# Patient Record
Sex: MALE | Race: WHITE | NOT HISPANIC OR LATINO | Employment: OTHER | URBAN - METROPOLITAN AREA
[De-identification: names, ages, dates, MRNs, and addresses within clinical notes are randomized per-mention and may not be internally consistent; named-entity substitution may affect disease eponyms.]

---

## 2018-08-23 ENCOUNTER — HOSPITAL ENCOUNTER (OUTPATIENT)
Facility: HOSPITAL | Age: 53
Discharge: HOME | End: 2018-08-26
Attending: EMERGENCY MEDICINE | Admitting: INTERNAL MEDICINE
Payer: COMMERCIAL

## 2018-08-23 DIAGNOSIS — R31.0 GROSS HEMATURIA: Primary | ICD-10-CM

## 2018-08-23 DIAGNOSIS — R55 SYNCOPE AND COLLAPSE: ICD-10-CM

## 2018-08-23 DIAGNOSIS — R41.89 UNRESPONSIVENESS: ICD-10-CM

## 2018-08-23 DIAGNOSIS — R55 VASOVAGAL SYNCOPE: ICD-10-CM

## 2018-08-23 DIAGNOSIS — R31.9 HEMATURIA, UNSPECIFIED TYPE: ICD-10-CM

## 2018-08-23 DIAGNOSIS — R03.0 ELEVATED BLOOD-PRESSURE READING, WITHOUT DIAGNOSIS OF HYPERTENSION: ICD-10-CM

## 2018-08-23 DIAGNOSIS — D72.829 LEUKOCYTOSIS, UNSPECIFIED TYPE: ICD-10-CM

## 2018-08-23 DIAGNOSIS — R11.0 NAUSEA: ICD-10-CM

## 2018-08-23 DIAGNOSIS — N30.01 ACUTE CYSTITIS WITH HEMATURIA: ICD-10-CM

## 2018-08-23 PROCEDURE — G0378 HOSPITAL OBSERVATION PER HR: HCPCS

## 2018-08-23 PROCEDURE — 51703 INSERT BLADDER CATH COMPLEX: CPT

## 2018-08-23 PROCEDURE — 25000003 PHARM REV CODE 250: Performed by: EMERGENCY MEDICINE

## 2018-08-23 PROCEDURE — 99284 EMERGENCY DEPT VISIT MOD MDM: CPT

## 2018-08-23 RX ORDER — NITROFURANTOIN 25; 75 MG/1; MG/1
100 CAPSULE ORAL
Status: COMPLETED | OUTPATIENT
Start: 2018-08-23 | End: 2018-08-23

## 2018-08-23 RX ADMIN — NITROFURANTOIN (MONOHYDRATE/MACROCRYSTALS) 100 MG: 75; 25 CAPSULE ORAL at 10:08

## 2018-08-24 DIAGNOSIS — R31.0 GROSS HEMATURIA: Primary | ICD-10-CM

## 2018-08-24 PROBLEM — R31.9 URINARY TRACT INFECTION WITH HEMATURIA: Status: ACTIVE | Noted: 2018-08-24

## 2018-08-24 PROBLEM — N39.0 URINARY TRACT INFECTION WITH HEMATURIA: Status: ACTIVE | Noted: 2018-08-24

## 2018-08-24 PROBLEM — D72.829 LEUKOCYTOSIS: Status: RESOLVED | Noted: 2018-08-23 | Resolved: 2018-08-24

## 2018-08-24 PROBLEM — R03.0 ELEVATED BLOOD-PRESSURE READING, WITHOUT DIAGNOSIS OF HYPERTENSION: Status: ACTIVE | Noted: 2018-08-24

## 2018-08-24 PROBLEM — R41.89 UNRESPONSIVE: Status: ACTIVE | Noted: 2018-08-24

## 2018-08-24 LAB
ANION GAP SERPL CALC-SCNC: 10 MMOL/L
BACTERIA #/AREA URNS HPF: ABNORMAL /HPF
BASOPHILS # BLD AUTO: 0 K/UL
BASOPHILS NFR BLD: 0.2 %
BILIRUB UR QL STRIP: NEGATIVE
BUN SERPL-MCNC: 9 MG/DL
CALCIUM SERPL-MCNC: 8.8 MG/DL
CHLORIDE SERPL-SCNC: 101 MMOL/L
CK MB SERPL-MCNC: 1.1 NG/ML
CK MB SERPL-RTO: 1.4 %
CK SERPL-CCNC: 81 U/L
CK SERPL-CCNC: 81 U/L
CLARITY UR: ABNORMAL
CO2 SERPL-SCNC: 25 MMOL/L
COLOR UR: YELLOW
CREAT SERPL-MCNC: 0.8 MG/DL
DIFFERENTIAL METHOD: ABNORMAL
EOSINOPHIL # BLD AUTO: 0 K/UL
EOSINOPHIL NFR BLD: 0.3 %
ERYTHROCYTE [DISTWIDTH] IN BLOOD BY AUTOMATED COUNT: 13.7 %
EST. GFR  (AFRICAN AMERICAN): >60 ML/MIN/1.73 M^2
EST. GFR  (NON AFRICAN AMERICAN): >60 ML/MIN/1.73 M^2
GLUCOSE SERPL-MCNC: 134 MG/DL
GLUCOSE UR QL STRIP: NEGATIVE
HCT VFR BLD AUTO: 39 %
HGB BLD-MCNC: 12.9 G/DL
HGB UR QL STRIP: ABNORMAL
HYALINE CASTS #/AREA URNS LPF: 0 /LPF
KETONES UR QL STRIP: NEGATIVE
LACTATE SERPL-SCNC: 1.1 MMOL/L
LDH SERPL L TO P-CCNC: 181 U/L
LEUKOCYTE ESTERASE UR QL STRIP: ABNORMAL
LYMPHOCYTES # BLD AUTO: 1.6 K/UL
LYMPHOCYTES NFR BLD: 12.8 %
MCH RBC QN AUTO: 28.7 PG
MCHC RBC AUTO-ENTMCNC: 33.2 G/DL
MCV RBC AUTO: 87 FL
MICROSCOPIC COMMENT: ABNORMAL
MONOCYTES # BLD AUTO: 0.8 K/UL
MONOCYTES NFR BLD: 6.4 %
NEUTROPHILS # BLD AUTO: 10.3 K/UL
NEUTROPHILS NFR BLD: 80.3 %
NITRITE UR QL STRIP: NEGATIVE
PH UR STRIP: 8 [PH] (ref 5–8)
PLATELET # BLD AUTO: 265 K/UL
PMV BLD AUTO: 7.7 FL
POCT GLUCOSE: 143 MG/DL (ref 70–110)
POTASSIUM SERPL-SCNC: 3.5 MMOL/L
PROT UR QL STRIP: ABNORMAL
RBC # BLD AUTO: 4.51 M/UL
RBC #/AREA URNS HPF: >100 /HPF (ref 0–4)
SODIUM SERPL-SCNC: 136 MMOL/L
SP GR UR STRIP: 1.02 (ref 1–1.03)
TROPONIN I SERPL DL<=0.01 NG/ML-MCNC: <0.006 NG/ML
TROPONIN I SERPL DL<=0.01 NG/ML-MCNC: <0.006 NG/ML
URN SPEC COLLECT METH UR: ABNORMAL
UROBILINOGEN UR STRIP-ACNC: NEGATIVE EU/DL
WBC # BLD AUTO: 12.8 K/UL
WBC #/AREA URNS HPF: 10 /HPF (ref 0–5)

## 2018-08-24 PROCEDURE — 85025 COMPLETE CBC W/AUTO DIFF WBC: CPT

## 2018-08-24 PROCEDURE — G0378 HOSPITAL OBSERVATION PER HR: HCPCS

## 2018-08-24 PROCEDURE — 25000003 PHARM REV CODE 250: Performed by: UROLOGY

## 2018-08-24 PROCEDURE — 63600175 PHARM REV CODE 636 W HCPCS: Performed by: UROLOGY

## 2018-08-24 PROCEDURE — 82553 CREATINE MB FRACTION: CPT

## 2018-08-24 PROCEDURE — 81000 URINALYSIS NONAUTO W/SCOPE: CPT

## 2018-08-24 PROCEDURE — 82550 ASSAY OF CK (CPK): CPT

## 2018-08-24 PROCEDURE — 87086 URINE CULTURE/COLONY COUNT: CPT

## 2018-08-24 PROCEDURE — 99245 OFF/OP CONSLTJ NEW/EST HI 55: CPT | Mod: 25,,, | Performed by: UROLOGY

## 2018-08-24 PROCEDURE — 93005 ELECTROCARDIOGRAM TRACING: CPT

## 2018-08-24 PROCEDURE — 25000003 PHARM REV CODE 250: Performed by: FAMILY MEDICINE

## 2018-08-24 PROCEDURE — 84484 ASSAY OF TROPONIN QUANT: CPT | Mod: 91

## 2018-08-24 PROCEDURE — 99358 PROLONG SERVICE W/O CONTACT: CPT | Mod: ,,, | Performed by: UROLOGY

## 2018-08-24 PROCEDURE — 25000003 PHARM REV CODE 250: Performed by: EMERGENCY MEDICINE

## 2018-08-24 PROCEDURE — 36415 COLL VENOUS BLD VENIPUNCTURE: CPT

## 2018-08-24 PROCEDURE — 99354 PR PROLONGED SVC, OUPT, 1ST HR: CPT | Mod: ,,, | Performed by: UROLOGY

## 2018-08-24 PROCEDURE — 83615 LACTATE (LD) (LDH) ENZYME: CPT

## 2018-08-24 PROCEDURE — 80048 BASIC METABOLIC PNL TOTAL CA: CPT

## 2018-08-24 PROCEDURE — 25500020 PHARM REV CODE 255

## 2018-08-24 PROCEDURE — 93010 ELECTROCARDIOGRAM REPORT: CPT | Mod: ,,, | Performed by: INTERNAL MEDICINE

## 2018-08-24 PROCEDURE — 84484 ASSAY OF TROPONIN QUANT: CPT

## 2018-08-24 PROCEDURE — 83605 ASSAY OF LACTIC ACID: CPT

## 2018-08-24 PROCEDURE — 63600175 PHARM REV CODE 636 W HCPCS: Performed by: INTERNAL MEDICINE

## 2018-08-24 RX ORDER — SODIUM CHLORIDE 9 MG/ML
INJECTION, SOLUTION INTRAVENOUS CONTINUOUS
Status: DISCONTINUED | OUTPATIENT
Start: 2018-08-24 | End: 2018-08-26 | Stop reason: HOSPADM

## 2018-08-24 RX ORDER — CIPROFLOXACIN 500 MG/1
500 TABLET ORAL 2 TIMES DAILY
Qty: 8 TABLET | Refills: 0 | Status: SHIPPED | OUTPATIENT
Start: 2018-08-25 | End: 2018-08-24 | Stop reason: SDUPTHER

## 2018-08-24 RX ORDER — SODIUM CHLORIDE 9 MG/ML
INJECTION, SOLUTION INTRAVENOUS
Status: DISPENSED
Start: 2018-08-24 | End: 2018-08-24

## 2018-08-24 RX ORDER — GENTAMICIN SULFATE 40 MG/ML
80 INJECTION, SOLUTION INTRAMUSCULAR; INTRAVENOUS ONCE
Status: COMPLETED | OUTPATIENT
Start: 2018-08-24 | End: 2018-08-24

## 2018-08-24 RX ORDER — MORPHINE SULFATE 2 MG/ML
2 INJECTION, SOLUTION INTRAMUSCULAR; INTRAVENOUS EVERY 4 HOURS PRN
Status: DISCONTINUED | OUTPATIENT
Start: 2018-08-24 | End: 2018-08-26 | Stop reason: HOSPADM

## 2018-08-24 RX ORDER — ACETAMINOPHEN 325 MG/1
650 TABLET ORAL EVERY 6 HOURS PRN
Status: DISCONTINUED | OUTPATIENT
Start: 2018-08-24 | End: 2018-08-26 | Stop reason: HOSPADM

## 2018-08-24 RX ORDER — HYDRALAZINE HYDROCHLORIDE 25 MG/1
25 TABLET, FILM COATED ORAL EVERY 8 HOURS
Status: DISCONTINUED | OUTPATIENT
Start: 2018-08-24 | End: 2018-08-26 | Stop reason: HOSPADM

## 2018-08-24 RX ORDER — HYDRALAZINE HYDROCHLORIDE 25 MG/1
25 TABLET, FILM COATED ORAL EVERY 8 HOURS PRN
Qty: 30 TABLET | Refills: 0 | Status: ON HOLD | OUTPATIENT
Start: 2018-08-24 | End: 2018-09-11

## 2018-08-24 RX ORDER — ONDANSETRON 2 MG/ML
4 INJECTION INTRAMUSCULAR; INTRAVENOUS EVERY 8 HOURS PRN
Status: DISCONTINUED | OUTPATIENT
Start: 2018-08-24 | End: 2018-08-26 | Stop reason: HOSPADM

## 2018-08-24 RX ORDER — TAMSULOSIN HYDROCHLORIDE 0.4 MG/1
0.4 CAPSULE ORAL DAILY
Status: DISCONTINUED | OUTPATIENT
Start: 2018-08-24 | End: 2018-08-26 | Stop reason: HOSPADM

## 2018-08-24 RX ORDER — LIDOCAINE HYDROCHLORIDE 20 MG/ML
JELLY TOPICAL ONCE
Status: CANCELLED | OUTPATIENT
Start: 2018-08-24 | End: 2018-08-24

## 2018-08-24 RX ORDER — CIPROFLOXACIN 500 MG/1
500 TABLET ORAL 2 TIMES DAILY
Qty: 12 TABLET | Refills: 0 | OUTPATIENT
Start: 2018-08-25 | End: 2018-08-26 | Stop reason: SDUPTHER

## 2018-08-24 RX ORDER — CEFTRIAXONE 1 G/50ML
1 INJECTION, SOLUTION INTRAVENOUS
Status: DISCONTINUED | OUTPATIENT
Start: 2018-08-24 | End: 2018-08-25

## 2018-08-24 RX ORDER — TAMSULOSIN HYDROCHLORIDE 0.4 MG/1
0.4 CAPSULE ORAL DAILY
Qty: 30 CAPSULE | Refills: 0 | Status: SHIPPED | OUTPATIENT
Start: 2018-08-25 | End: 2018-08-26

## 2018-08-24 RX ADMIN — CEFTRIAXONE 1 G: 1 INJECTION, SOLUTION INTRAVENOUS at 11:08

## 2018-08-24 RX ADMIN — TAMSULOSIN HYDROCHLORIDE 0.4 MG: 0.4 CAPSULE ORAL at 11:08

## 2018-08-24 RX ADMIN — GENTAMICIN SULFATE 80 MG: 40 INJECTION, SOLUTION INTRAMUSCULAR; INTRAVENOUS at 11:08

## 2018-08-24 RX ADMIN — SODIUM CHLORIDE: 0.9 INJECTION, SOLUTION INTRAVENOUS at 12:08

## 2018-08-24 RX ADMIN — MORPHINE SULFATE 2 MG: 2 INJECTION, SOLUTION INTRAMUSCULAR; INTRAVENOUS at 07:08

## 2018-08-24 RX ADMIN — SODIUM CHLORIDE: 0.9 INJECTION, SOLUTION INTRAVENOUS at 09:08

## 2018-08-24 RX ADMIN — MORPHINE SULFATE 2 MG: 2 INJECTION, SOLUTION INTRAMUSCULAR; INTRAVENOUS at 12:08

## 2018-08-24 RX ADMIN — HYDRALAZINE HYDROCHLORIDE 25 MG: 25 TABLET, FILM COATED ORAL at 09:08

## 2018-08-24 RX ADMIN — MORPHINE SULFATE 2 MG: 2 INJECTION, SOLUTION INTRAMUSCULAR; INTRAVENOUS at 11:08

## 2018-08-24 RX ADMIN — IOHEXOL 75 ML: 350 INJECTION, SOLUTION INTRAVENOUS at 11:08

## 2018-08-24 NOTE — ED NOTES
New irrigation bag hung due to the one on board was empty upon arrival to the ER.  Family at bedside.  Urine appears bright red at this time.  No apparent clots noted.

## 2018-08-24 NOTE — HOSPITAL COURSE
Mr. Segovia is a 54 yo male with hx of renal calculi transferred from [facility] for hematuria. He came with 3 way diaz and underwent bladder irrigation for 24 hours. Diaz was removed and he had intermittent blood clot passage with intermittent clear urine with good urine output. At time of discharge on 8/24 he had syncope event while sitting. He was not standing up fast and had not taken blood pressure meds beforehand. He did have difficult to treat HTN during his admission and had lisinopril and hydralazine added to his medications.  Even immediately after syncope episode his blood pressure was elevated. He was given IV abx for UTI and discharged with 5 days of cipro which would complete an 8 day course of antibiotics for UTI.  Urine culture was negative. He had outpatient cystoscopy scheduled with Dr. Lopez. He was monitored on telemetry for 48 hours without any acute events and no evidence of syncope. Echocardiogram showed mild hypertrophy, but normal EF.  Request was placed for cardiology follow up as outpatient. Syncopal episode was thought to be from either cardiogenic cause vs vasovagal.

## 2018-08-24 NOTE — PROGRESS NOTES
[facility] Urology Consult:  Consult from: Dr Cloud, [facility] medicine  Consult for: gross hematuria    Bruce Segovia is a 53 y.o. male who presents for evaluation of gross hematuria.    The patient reports starting to pass some blood in his urine with blood clots approximately 4 days ago, which was progressively worsening to the point where yesterday he was having difficulty passing his urine with some pain associated with urination and presented to [place] [facility].  A non contrasted CT scan was done as he also has a history of kidney stones, and an 18 [place]ag[place] 3 way León catheter was placed and continuous bladder irrigation was initiated.  He was transferred to [facility] for Urology Services and higher level of care.  In the ER he had a white blood cell count of 13.4, lactate of 2.2, hemoglobin and hematocrit of 13.9 and 40.4, a creatinine of 0.66.  CT scan reported 2-3 mm nonobstructing right mid pole calculus and a low attenuating left renal lesion 1.5 cm, potentially a caliceal diverticulum.  The bladder was noted to contain a 7 cm hyper attenuating mass consistent with blood clot, and prostate gland was noted to be enlarged measuring 6 cm transverse.  He was given 1 L of normal saline, and was noted to have greater than 500 cc residual in the bladder, and so the 3 way León catheter, 18 Malagasy, was placed with continuous bladder irrigation and he was transferred after being given a p.o. dose of nitrofurantoin.  Urinalysis had large blood, trace ketones, nitrite positive, leukocyte esterase negative, 0-2 white blood cells, significant red blood cells, moderate bacteria,    On review of medical record, he presented to the emergency department at 4:48 p.m. and after above evaluation, transfer was initiated at 7:19 p.m. on 08/23/2018.  I was initially notified of this patient at 6:30 a.m. on 08/24/2018, and by nursing report he was transferred to the emergency department with the 18 Malag[place] 3 way León  catheter already in place.  No attempts at manual irrigation were done by the ER or by floor nursing overnight and reported that despite CBI urine was still red or punch colored.  Review of ER note notes patient transferred in on CBI with Roberta colored urine admission range with hospitalist.  Again Urology not notified throughout entire transfer or admission process, and consult called in the following morning.    Again, discussion with the patient, he reported approximately 4 days of clot passage in his urine progressively worsening until his presentation yesterday.  He did note for the few months prior to this episode he was having more difficulty passing his urine and a weaker urinary stream.  He has a non/never smoker  He did have gross hematuria and clot passage approximately 4 years ago which he attributed to kidney stone passage at that time.  He has never seen a urologist  No family history of bladder cancer kidney cancer or urothelial carcinoma.  His father had prostate cancer.  He otherwise denies past medical history  He did have mild occasional dysuria, though really only when passing the clots    Upon learning of the patient this morning, I did advise the nurse to pause the CBI and manually irrigate, it was reported to me that there were no clots but the urine remained red.    Past Medical History:   Diagnosis Date    Kidney stone     approx 3-4 yrs ago       Past Surgical History:   Procedure Laterality Date    APPENDECTOMY      approx. 20 yrs ago       No family history on file.    Social History     Socioeconomic History    Marital status:      Spouse name: Not on file    Number of children: Not on file    Years of education: Not on file    Highest education level: Not on file   Social Needs    Financial resource strain: Not on file    Food insecurity - worry: Not on file    Food insecurity - inability: Not on file    Transportation needs - medical: Not on file    Transportation  needs - non-medical: Not on file   Occupational History    Not on file   Tobacco Use    Smoking status: Not on file   Substance and Sexual Activity    Alcohol use: Not on file    Drug use: Not on file    Sexual activity: Not on file   Other Topics Concern    Not on file   Social History Narrative    Not on file       Review of patient's allergies indicates:  No Known Allergies    Medications Reviewed: see MAR    ROS:    Constitutional: denies fevers, chills, night sweats, fatigue, malaise  Respiratory: negative for cough, shortness of breath, wheezing, dyspnea.  Cardiovascular:  negative for chest pain, varicose veins, ankle swelling, palpitations, syncope.  GI: negative for abdominal pain, heartburn, indigestion, nausea, vomiting, constipation, diarrhea, blood in stool.   Urology: as noted above in HPI  Endocrinology: negative for cold intolerance, excessive thirst, not feeling tired/sluggish, no heat intolerance.   Hematology/Lymph: negative for easy bleeding, easy bruising, swollen glands.  Musculoskeletal: negative for back pain, joint pain, joint swelling, neck pain.  Allergy-Immunology: negative for seasonal allergies, negative for unusual infections.   Skin: negative for boils, breast lumps, hives, itching, rash.   Neurology: negative for, dizziness, headache, tingling/numbness, tremors.   Psych: satisfied with life; negative for, anxiety, depression, suicidal thoughts.     PHYSICAL EXAM:    Vitals:    08/24/18 0330   BP: (!) 180/98   Pulse: 75   Resp: 18   Temp: 98.7 °F (37.1 °C)     Body mass index is 29.6 kg/m².       General: Alert, cooperative, no distress, appears stated age  Head: Normocephalic, without obvious abnormality, atraumatic  Neck: no masses, no thyromegaly, no lymphadenopathy  Eyes: PERRL, conjunctiva/corneas clear  Lungs: Respirations unlabored, normal effort, no accessory muscle use  CV: Warm and well perfused extremities  Abdomen: distended, bladder palpable, no CVA tenderness, no  hepatosplenomegaly, no hernia  Penis: phallus normal, circumcised, well cared for, no plaques or lesions.   Scrotum: no cysts, no lesions, no rash, no hydrocele.   Epididymes: normal, nontender, symmetrical, no masses or cysts.   Testes: normal, both descended, no masses.   Urethra: 18 fr diaz in place draining opaque red urine, and blood clots at meatus and in towel under patients diaz  DANIEL: normal sphincter tone, no masses, no hemmorrhoids   PROSTATE: 30g, no nodules, non-tender, symmetrical.   Extremities: Extremities normal, atraumatic, no cyanosis or edema  Skin: Normal color, texture, and turgor, no rashes or lesions  Psych: Appropriate, well oriented, normal affect, normal mood  Neuro: Non-focal    Diaz Change/Manual Irrigation:  Using the patient's current indwelling 18 Citizen of [place] 3 way Diaz catheter, I used a 60 cc catheter tip syringe with sterile water to 1st instill 60 cc into the bladder, and with subsequent syringe full flush and irrigate.  Resistance was met almost immediately.  At this time, given his presentation and concern for clot retention despite Diaz catheter, I did remove his indwelling Diaz catheter and found to be completely clotted off.  Using aseptic technique and sterilely prepping meatus with Betadine, I did then passed a 22 Citizen of [place] 3 way Diaz catheter into the bladder with immediate return of bloody urine, inflated the balloon with 10 cc of sterile water.  With the new catheter I was able to then manually irrigate as above with 60 cc catheter tip syringe and sterile water and medially aspirated syringe fulls of pure blood clots.  He did have a very significant clot burden and did take 2 L of sterile water manual irrigation, the majority of which returned pure clot to clear his bladder.  After 2 L of manual irrigation instill aspirating clots with the patient still uncomfortable and distended abdomen, there was difficulty aspirating through the syringe.  I did notify the operating  room to add the patient on for a clot evacuation, and then return to keep working.  His urine did clear, and ultimately with the urine draining clear I did then perform of bedside bladder scan and found his bladder to be empty was 0 cc around the León catheter.  I did cancel that on and left the León catheter draining clear with a plug in the 3rd port.      Assessment/Diagnosis:    Gross hematuria and clot retention    Plans:  After significant efforts at manual irrigation to clear the patient's bladder of his significant blood clot burden, I was able to review the CT scan images from [facility] which were uploaded into our system which did demonstrate significant clot burden filling his entire distended bladder. Corroborated other CT findings such as small nonobstructing stones.  As the stone was non contrasted, I did recommend a CT urogram to complete his gross hematuria workup imaging and clear his upper tracts.  Also noted that this would re-evaluate the bladder as well.    He has no significant risk factors for gross hematuria.  My suspicion at this time as he has a combination of a urinary tract infection and prostatic obstruction with delayed presentation of gross hematuria as a result of the above combination, after 4 days of clot passage.    I did discuss with both the patient and person common his wife by phone, to be observed throughout the morning for any recurrence of his gross hematuria now that his León catheter was draining clear.  If he did have recurrent significant gross hematuria or clot retention, we would then have to proceed urgently to the operating room for clot evacuation and evaluation of his bladder and management of any significant source of bleeding.  His urine remained relatively clear, would err on the side of outpatient workup with flexible cystourethroscopy, especially given concern for active urinary tract infection with nitrite positive urine and leukocytosis.  We did discuss the  importance of clearing infection before lower tract manipulation.    Given his nitrite positive urine with likely infection and significant efforts to irrigate his bladder, he did only receive 1 p.o. dose of nitrofurantoin in the ER and therefore I initiated 1 g of Rocephin IV and 80 mg of gentamicin IM.  As well, given his prostatomegaly and history of weakening urinary stream prior to clot passage, given dose of Flomax, and advise that he continue it daily.    Should his urine remain clear by lunchtime, can resume diet and if clinically stable would advocate for Diaz removal and voiding trial in the inpatient setting.  If he was unable to void he would then be discharged home with a Diaz catheter.  Assuming he will not need emergent surgery at this time after significant bedside intervention above, when stable for discharge, should be given 1 week empiric antibiotics pending results of urine culture (which will be collected prior to abx after has had time to drain urine only in absence of irrigation) as well as daily flomax, and will plan outpatient cystoscopy.    I was able to review his CT urogram noting simple bilateral renal cysts and clearance of clot burden from bladder. His urine remained clear with light pink tinge by noon and therefore his diaz was removed for voiding trial and he did void clear urine with one small residual clot. Felt clinically improved. Discussed with patient wife and Dr Cloud recommendation to proceed with outpatient cystoscopic workup after 1 week antibiotics, and will follow up culture to make sure culture specific treatment. I have scheduled the patient for cystoscopic evaluation at [facility] on 9/4/18 and should continue daily flomax, increase hydration, complete abx. All questions he and his wife had were answered and agreeable to treatment plan. Should he have recurrence of pascual gross hematuria or clot passage he should return in the interim.    More than 2 hours spent in  direct patient encounter, including prolonged efforts at manual bladder irrigation as described above, and extensive counseling regarding workup and plan. As well, over 1 hour spent on unit and outside of face to face encounter reviewing transfer record, getting supplies for diaz change/irrigation above, and other time spent on unit communicating with nursing staff and OR to coordinate care.

## 2018-08-24 NOTE — PROGRESS NOTES
"Dr. Lopez and I at bed side.  Patient pre-medicated with 2mg morphine.  18Fr 3-way diaz removed.  22Fr 3-way diaz placed. Dr. Lopez manually irrigated diaz.  Retrieved significant amounts of clots.  Patient expressed discomfort.  Patient given a "break" from manual irrigation.  Diaz bag attached.  Urine draining at slow rate.  "

## 2018-08-24 NOTE — ASSESSMENT & PLAN NOTE
As seen on bladder irrigation  Unknown etiology, may be related to nephrolithiasis vs malignancy  Will consult urology for AM  Will obtain renal ultrasound for further characterization of renal lesion  UA pending although may not be much help. Considering leukocytosis will start antibiotic empirically

## 2018-08-24 NOTE — PROGRESS NOTES
Over the phone verbal consent received from patient with with 2 nurse witness, for possible procedure today for Clot Evacuation.

## 2018-08-24 NOTE — PLAN OF CARE
Patient AAOx4.  VSS.  Has remained afebrile this shift.  IVF infusing per orders.  León cath intact with CBI at moderate rate, urine pink-tinged and clot free.  Patient rested peacefully majority of night.  POC reviewed with patient.  Open discussion was facilitated.  Patient verbalized understanding.  Bed in lowest position, side rails up x2, call light within reach, and safety measures maintained throughout shift.  Patient denies any needs at this time.  Will continue to monitor.

## 2018-08-24 NOTE — PLAN OF CARE
Cm completed the assessment at pt's bedside.  Pt arrived from [facility].  Pt is independent in care.  PCP is Dr. Mckinnon.  Insurance verified as [employer].  Pt denies diabetes, dialysis and coumadin.  Disposition:  Pt will discharge to home with family.  Pt verbalized no needs at this time.       08/24/18 1150   Discharge Assessment   Assessment Type Discharge Planning Assessment   Confirmed/corrected address and phone number on facesheet? Yes   Assessment information obtained from? Patient   Communicated expected length of stay with patient/caregiver yes   Prior to hospitilization cognitive status: Alert/Oriented   Prior to hospitalization functional status: Independent   Current cognitive status: Alert/Oriented   Current Functional Status: Independent   Facility Arrived From: [facility]   Lives With spouse   Able to Return to Prior Arrangements yes   Is patient able to care for self after discharge? Yes   Who are your caregiver(s) and their phone number(s)? pamela Bullard - [phone]   Patient's perception of discharge disposition home or selfcare   Readmission Within The Last 30 Days no previous admission in last 30 days   Patient currently being followed by outpatient case management? No   Patient currently receives any other outside agency services? No   Equipment Currently Used at Home none   Do you have any problems affording any of your prescribed medications? No   Is the patient taking medications as prescribed? yes  ([facility])   Does the patient have transportation home? Yes   Transportation Available car;family or friend will provide   Dialysis Name and Scheduled days na   Does the patient receive services at the Coumadin Clinic? No   Discharge Plan A Home with family   Patient/Family In Agreement With Plan yes

## 2018-08-24 NOTE — HPI
Mr. Segovia is a 52 yo with hx of Renal calculi 4 years ago who presented to by transfer from [facility] in [place] for hematuria.  He started having hematuria with passage of large blood clots 3-4 days ago.  He had mild associated suprapubic pain, otherwise no wt loss, diarrhea, abdominal pain, nausea, vomiting, fever, chills, melena, hematochezia.  He had similar less severe problem 4 years ago at which time he had stones without intervention.  Father had prostate cancer, otherwise no malignancy hx in family. Denies tob, etoh, or illicit drugs.  At [place] CT scan showed 2-3 mm non-obstructing stone R midpole.  1.5 cm Left renal lesion possible representing calyceal diverticulum with milk of calcium, and large blood clot in bladder. He had 3 way diaz placed and bladder irrigation started, now with lan colored urine. WBC 13.4, HGB 13.9, plt 260. UA pending here at time of interview.

## 2018-08-24 NOTE — PROGRESS NOTES
Patient arrived to unit via stretcher.  Transferred to bed with assistance.  Peripheral IV intact.  3-way diaz intact with CBI flowing to gravity. Urine color resembles fruit punch.  Patient denies any pain at this time.  Wife at bedside.

## 2018-08-24 NOTE — PROGRESS NOTES
Call placed to Dr Cloud- no immediate response; text message sent via safe chat re: RRT episode. Pt's ekg showing Q waves in leads III and aVF- no ST elevation noted, no inversion of Twaves noted. Pt free of any complaints of pain at present. No complaints of nausea offered. Pt stating he is feeling better. Waiting for labs to be resulted for cardiac enzymes, bmp, cbc. Dr Lopez updated by Kary Ritter RN- orders received for lactic acid- no orders received to transfer pt off of [facility] at this time- Dr Lopez states decision to transfer is up to Dr Cloud. Awaiting further orders from Dr Cloud.

## 2018-08-24 NOTE — SUBJECTIVE & OBJECTIVE
No past medical history on file.    No past surgical history on file.    Review of patient's allergies indicates:  No Known Allergies    No current facility-administered medications on file prior to encounter.      No current outpatient medications on file prior to encounter.     Family History     None        Tobacco Use    Smoking status: Not on file   Substance and Sexual Activity    Alcohol use: Not on file    Drug use: Not on file    Sexual activity: Not on file     Review of Systems   Constitutional: Negative for activity change, appetite change, chills, diaphoresis, fatigue, fever and unexpected weight change.   HENT: Negative for congestion, hearing loss, mouth sores, nosebleeds, sinus pressure, sore throat and trouble swallowing.    Eyes: Negative for photophobia, redness and visual disturbance.   Respiratory: Negative for apnea, cough, chest tightness, shortness of breath and wheezing.    Cardiovascular: Negative for chest pain, palpitations and leg swelling.   Gastrointestinal: Negative for abdominal distention, abdominal pain, blood in stool, constipation, diarrhea, nausea and vomiting.   Endocrine: Negative for polydipsia, polyphagia and polyuria.   Genitourinary: Positive for hematuria. Negative for decreased urine volume, difficulty urinating, dysuria, frequency and urgency.   Musculoskeletal: Negative for arthralgias, back pain, gait problem, myalgias and neck stiffness.   Neurological: Negative for dizziness, tremors, seizures, syncope, weakness, numbness and headaches.   Hematological: Negative for adenopathy. Does not bruise/bleed easily.   Psychiatric/Behavioral: Negative for agitation, behavioral problems, confusion and decreased concentration.     Objective:     Vital Signs (Most Recent):  Temp: 98.5 °F (36.9 °C) (08/23/18 2153)  Pulse: 99 (08/23/18 2153)  Resp: 18 (08/23/18 2153)  BP: (!) 184/107 (08/23/18 2153)  SpO2: 97 % (08/23/18 2153) Vital Signs (24h Range):  Temp:  [98.5 °F (36.9  °C)] 98.5 °F (36.9 °C)  Pulse:  [99] 99  Resp:  [18] 18  SpO2:  [97 %] 97 %  BP: (184)/(107) 184/107     Weight: 74.8 kg (165 lb)  Body mass index is 26.63 kg/m².    Physical Exam   Constitutional: He is oriented to person, place, and time. He appears well-developed and well-nourished. No distress.   HENT:   Head: Normocephalic and atraumatic.   Nose: Nose normal.   Mouth/Throat: No oropharyngeal exudate.   Eyes: EOM are normal. Pupils are equal, round, and reactive to light. Right eye exhibits no discharge. Left eye exhibits no discharge. No scleral icterus.   Neck: Normal range of motion. Neck supple. No JVD present. No tracheal deviation present. No thyromegaly present.   Cardiovascular: Normal rate, regular rhythm, normal heart sounds and intact distal pulses. Exam reveals no gallop and no friction rub.   No murmur heard.  Pulmonary/Chest: Effort normal and breath sounds normal. No stridor. No respiratory distress. He has no wheezes. He has no rales.   Abdominal: Soft. Bowel sounds are normal. He exhibits no distension and no mass. There is no tenderness. There is no guarding.   Genitourinary:   Genitourinary Comments: León catheter in place   Musculoskeletal: Normal range of motion. He exhibits no edema, tenderness or deformity.   Neurological: He is alert and oriented to person, place, and time. No cranial nerve deficit. He exhibits normal muscle tone. Coordination normal.   Skin: Skin is warm and dry. Capillary refill takes less than 2 seconds. Rash noted. He is not diaphoretic. No erythema. No pallor.   Psychiatric: He has a normal mood and affect. His behavior is normal. Judgment and thought content normal.        Significant Labs: All pertinent labs within the past 24 hours have been reviewed.    Significant Imaging: I have reviewed and interpreted all pertinent imaging results/findings within the past 24 hours.

## 2018-08-24 NOTE — ED PROVIDER NOTES
Encounter Date: 8/23/2018       History     Chief Complaint   Patient presents with    Hematuria     transfer from [place] for urology      Chief complaint:  Hematuria    HPI:  53-year-old male presents via ambulance in transfer from [facility] with gross hematuria and urinary retention.  He developed urinary frequency and dysuria 2 days ago with gross hematuria today and difficulty urinating.  Three way catheter was placed with irrigation of the bladder with clots removed.  He has going Roberta a urine.  He is not anticoagulated and has no bleeding diathesis.  H&H is 13.9 and 40.  CT of the abdomen and pelvis demonstrates intrarenal nephrolithiasis of the right kidney.  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          Review of patient's allergies indicates:  No Known Allergies  No past medical history on file.  No past surgical history on file.  No family history on file.  Social History     Tobacco Use    Smoking status: Not on file   Substance Use Topics    Alcohol use: Not on file    Drug use: Not on file     Review of Systems   Constitutional: Negative for activity change, appetite change, chills, fatigue and fever.   Eyes: Negative for visual disturbance.   Respiratory: Negative for apnea and shortness of breath.    Cardiovascular: Negative for chest pain and palpitations.   Gastrointestinal: Negative for abdominal distention and abdominal pain.   Genitourinary: Positive for decreased urine volume, frequency, hematuria and urgency. Negative for difficulty urinating.   Musculoskeletal: Negative for neck pain.   Skin: Negative for pallor and rash.   Neurological: Negative for headaches.   Hematological: Does not bruise/bleed easily.   Psychiatric/Behavioral: Negative for agitation.       Physical Exam     Initial Vitals [08/23/18 2153]   BP Pulse Resp Temp SpO2   (!) 184/107 99 18 98.5 °F (36.9 °C) 97 %      MAP       --         Physical Exam    Nursing note and vitals  reviewed.  Constitutional: He appears well-developed.   HENT:   Head: Normocephalic and atraumatic.   Mouth/Throat: Oropharynx is clear and moist.   Eyes: EOM are normal. Pupils are equal, round, and reactive to light.   Neck: Neck supple.   Cardiovascular: Normal rate, regular rhythm, normal heart sounds and intact distal pulses.   Pulmonary/Chest: Breath sounds normal.   Abdominal: Soft. Bowel sounds are normal. There is no tenderness.   Musculoskeletal: Normal range of motion.   Neurological: He is alert and oriented to person, place, and time.   Skin: Skin is warm and dry.   Psychiatric: He has a normal mood and affect.         ED Course   Procedures  Labs Reviewed   CULTURE, URINE   URINALYSIS          Imaging Results    None          Medical Decision Making:   ED Management:  53-year-old nonsmoker presents with a 2 day history of dysuria with gross hematuria and urinary obstruction.  Clots were irrigated patient will be admitted for bladder irrigation and empiric antibiotic treatment with Urology consultation.  Other:   I have discussed this case with another health care provider.       <> Summary of the Discussion: Discussed with Dr. Varma who will admit the patient                      Clinical Impression:   The encounter diagnosis was Hematuria, unspecified type.                             Sky Bower III, MD  08/23/18 1034

## 2018-08-24 NOTE — H&P (VIEW-ONLY)
[facility] Urology Consult:  Consult from: Dr Cloud, hospitals medicine  Consult for: gross hematuria    Bruce Segovia is a 53 y.o. male who presents for evaluation of gross hematuria.    The patient reports starting to pass some blood in his urine with blood clots approximately 4 days ago, which was progressively worsening to the point where yesterday he was having difficulty passing his urine with some pain associated with urination and presented to [place] [facility].  A non contrasted CT scan was done as he also has a history of kidney stones, and an 18 Filipino 3 way León catheter was placed and continuous bladder irrigation was initiated.  He was transferred to [facility] for Urology Services and higher level of care.  In the ER he had a white blood cell count of 13.4, lactate of 2.2, hemoglobin and hematocrit of 13.9 and 40.4, a creatinine of 0.66.  CT scan reported 2-3 mm nonobstructing right mid pole calculus and a low attenuating left renal lesion 1.5 cm, potentially a caliceal diverticulum.  The bladder was noted to contain a 7 cm hyper attenuating mass consistent with blood clot, and prostate gland was noted to be enlarged measuring 6 cm transverse.  He was given 1 L of normal saline, and was noted to have greater than 500 cc residual in the bladder, and so the 3 way León catheter, 18 Filipino, was placed with continuous bladder irrigation and he was transferred after being given a p.o. dose of nitrofurantoin.  Urinalysis had large blood, trace ketones, nitrite positive, leukocyte esterase negative, 0-2 white blood cells, significant red blood cells, moderate bacteria,    On review of medical record, he presented to the emergency department at 4:48 p.m. and after above evaluation, transfer was initiated at 7:19 p.m. on 08/23/2018.  I was initially notified of this patient at 6:30 a.m. on 08/24/2018, and by nursing report he was transferred to the emergency department with the 18 Filipino 3 way León  catheter already in place.  No attempts at manual irrigation were done by the ER or by floor nursing overnight and reported that despite CBI urine was still red or punch colored.  Review of ER note notes patient transferred in on CBI with Roberta colored urine admission range with hospitalist.  Again Urology not notified throughout entire transfer or admission process, and consult called in the following morning.    Again, discussion with the patient, he reported approximately 4 days of clot passage in his urine progressively worsening until his presentation yesterday.  He did note for the few months prior to this episode he was having more difficulty passing his urine and a weaker urinary stream.  He has a non/never smoker  He did have gross hematuria and clot passage approximately 4 years ago which he attributed to kidney stone passage at that time.  He has never seen a urologist  No family history of bladder cancer kidney cancer or urothelial carcinoma.  His father had prostate cancer.  He otherwise denies past medical history  He did have mild occasional dysuria, though really only when passing the clots    Upon learning of the patient this morning, I did advise the nurse to pause the CBI and manually irrigate, it was reported to me that there were no clots but the urine remained red.    Past Medical History:   Diagnosis Date    Kidney stone     approx 3-4 yrs ago       Past Surgical History:   Procedure Laterality Date    APPENDECTOMY      approx. 20 yrs ago       No family history on file.    Social History     Socioeconomic History    Marital status:      Spouse name: Not on file    Number of children: Not on file    Years of education: Not on file    Highest education level: Not on file   Social Needs    Financial resource strain: Not on file    Food insecurity - worry: Not on file    Food insecurity - inability: Not on file    Transportation needs - medical: Not on file    Transportation  needs - non-medical: Not on file   Occupational History    Not on file   Tobacco Use    Smoking status: Not on file   Substance and Sexual Activity    Alcohol use: Not on file    Drug use: Not on file    Sexual activity: Not on file   Other Topics Concern    Not on file   Social History Narrative    Not on file       Review of patient's allergies indicates:  No Known Allergies    Medications Reviewed: see MAR    ROS:    Constitutional: denies fevers, chills, night sweats, fatigue, malaise  Respiratory: negative for cough, shortness of breath, wheezing, dyspnea.  Cardiovascular:  negative for chest pain, varicose veins, ankle swelling, palpitations, syncope.  GI: negative for abdominal pain, heartburn, indigestion, nausea, vomiting, constipation, diarrhea, blood in stool.   Urology: as noted above in HPI  Endocrinology: negative for cold intolerance, excessive thirst, not feeling tired/sluggish, no heat intolerance.   Hematology/Lymph: negative for easy bleeding, easy bruising, swollen glands.  Musculoskeletal: negative for back pain, joint pain, joint swelling, neck pain.  Allergy-Immunology: negative for seasonal allergies, negative for unusual infections.   Skin: negative for boils, breast lumps, hives, itching, rash.   Neurology: negative for, dizziness, headache, tingling/numbness, tremors.   Psych: satisfied with life; negative for, anxiety, depression, suicidal thoughts.     PHYSICAL EXAM:    Vitals:    08/24/18 0330   BP: (!) 180/98   Pulse: 75   Resp: 18   Temp: 98.7 °F (37.1 °C)     Body mass index is 29.6 kg/m².       General: Alert, cooperative, no distress, appears stated age  Head: Normocephalic, without obvious abnormality, atraumatic  Neck: no masses, no thyromegaly, no lymphadenopathy  Eyes: PERRL, conjunctiva/corneas clear  Lungs: Respirations unlabored, normal effort, no accessory muscle use  CV: Warm and well perfused extremities  Abdomen: distended, bladder palpable, no CVA tenderness, no  hepatosplenomegaly, no hernia  Penis: phallus normal, circumcised, well cared for, no plaques or lesions.   Scrotum: no cysts, no lesions, no rash, no hydrocele.   Epididymes: normal, nontender, symmetrical, no masses or cysts.   Testes: normal, both descended, no masses.   Urethra: 18 fr diaz in place draining opaque red urine, and blood clots at meatus and in towel under patients diaz  DANIEL: normal sphincter tone, no masses, no hemmorrhoids   PROSTATE: 30g, no nodules, non-tender, symmetrical.   Extremities: Extremities normal, atraumatic, no cyanosis or edema  Skin: Normal color, texture, and turgor, no rashes or lesions  Psych: Appropriate, well oriented, normal affect, normal mood  Neuro: Non-focal    Diaz Change/Manual Irrigation:  Using the patient's current indwelling 18 Algerian 3 way Diaz catheter, I used a 60 cc catheter tip syringe with sterile water to 1st instill 60 cc into the bladder, and with subsequent syringe full flush and irrigate.  Resistance was met almost immediately.  At this time, given his presentation and concern for clot retention despite Diaz catheter, I did remove his indwelling Diaz catheter and found to be completely clotted off.  Using aseptic technique and sterilely prepping meatus with Betadine, I did then passed a 22 Algerian 3 way Diaz catheter into the bladder with immediate return of bloody urine, inflated the balloon with 10 cc of sterile water.  With the new catheter I was able to then manually irrigate as above with 60 cc catheter tip syringe and sterile water and medially aspirated syringe fulls of pure blood clots.  He did have a very significant clot burden and did take 2 L of sterile water manual irrigation, the majority of which returned pure clot to clear his bladder.  After 2 L of manual irrigation instill aspirating clots with the patient still uncomfortable and distended abdomen, there was difficulty aspirating through the syringe.  I did notify the operating  room to add the patient on for a clot evacuation, and then return to keep working.  His urine did clear, and ultimately with the urine draining clear I did then perform of bedside bladder scan and found his bladder to be empty was 0 cc around the León catheter.  I did cancel that on and left the León catheter draining clear with a plug in the 3rd port.      Assessment/Diagnosis:    Gross hematuria and clot retention    Plans:  After significant efforts at manual irrigation to clear the patient's bladder of his significant blood clot burden, I was able to review the CT scan images from [place] which were uploaded into our system which did demonstrate significant clot burden filling his entire distended bladder. Corroborated other CT findings such as small nonobstructing stones.  As the stone was non contrasted, I did recommend a CT urogram to complete his gross hematuria workup imaging and clear his upper tracts.  Also noted that this would re-evaluate the bladder as well.    He has no significant risk factors for gross hematuria.  My suspicion at this time as he has a combination of a urinary tract infection and prostatic obstruction with delayed presentation of gross hematuria as a result of the above combination, after 4 days of clot passage.    I did discuss with both the patient and person common his wife by phone, to be observed throughout the morning for any recurrence of his gross hematuria now that his León catheter was draining clear.  If he did have recurrent significant gross hematuria or clot retention, we would then have to proceed urgently to the operating room for clot evacuation and evaluation of his bladder and management of any significant source of bleeding.  His urine remained relatively clear, would err on the side of outpatient workup with flexible cystourethroscopy, especially given concern for active urinary tract infection with nitrite positive urine and leukocytosis.  We did discuss the  importance of clearing infection before lower tract manipulation.    Given his nitrite positive urine with likely infection and significant efforts to irrigate his bladder, he did only receive 1 p.o. dose of nitrofurantoin in the ER and therefore I initiated 1 g of Rocephin IV and 80 mg of gentamicin IM.  As well, given his prostatomegaly and history of weakening urinary stream prior to clot passage, given dose of Flomax, and advise that he continue it daily.    Should his urine remain clear by lunchtime, can resume diet and if clinically stable would advocate for Diaz removal and voiding trial in the inpatient setting.  If he was unable to void he would then be discharged home with a Diaz catheter.  Assuming he will not need emergent surgery at this time after significant bedside intervention above, when stable for discharge, should be given 1 week empiric antibiotics pending results of urine culture (which will be collected prior to abx after has had time to drain urine only in absence of irrigation) as well as daily flomax, and will plan outpatient cystoscopy.    I was able to review his CT urogram noting simple bilateral renal cysts and clearance of clot burden from bladder. His urine remained clear with light pink tinge by noon and therefore his diaz was removed for voiding trial and he did void clear urine with one small residual clot. Felt clinically improved. Discussed with patient wife and Dr Cloud recommendation to proceed with outpatient cystoscopic workup after 1 week antibiotics, and will follow up culture to make sure culture specific treatment. I have scheduled the patient for cystoscopic evaluation at [facility] on 9/4/18 and should continue daily flomax, increase hydration, complete abx. All questions he and his wife had were answered and agreeable to treatment plan. Should he have recurrence of pascual gross hematuria or clot passage he should return in the interim.    More than 2 hours spent in  direct patient encounter, including prolonged efforts at manual bladder irrigation as described above, and extensive counseling regarding workup and plan. As well, over 1 hour spent on unit and outside of face to face encounter reviewing transfer record, getting supplies for diaz change/irrigation above, and other time spent on unit communicating with nursing staff and OR to coordinate care.

## 2018-08-24 NOTE — PROGRESS NOTES
Received call from Kary patients nurse that pt had RRT event. EKG changes. Concern for orhtostasis.  Reported to have soft abdomen, have been voiding well and still clear/pink. Bladder scan with only 14cc.  Repeat wbc reported to be 12.1. No lactate was repeated as was borderline elevated at OSH.  Concern for urosepsis 2/2 nitritie positive urine with night of CBI with poor drainage 2/2 clotted diaz, and need for significant urgent manual irrigation efforts this morning. After doing so and learning he only had a po dose of nitrofurantoin in ER, gave 1g rocephin and 80 gent. Advise patient sounds like he is possibly getting septic. Asked what my course of action would be and if he should be in ICU and I advised that at this time he is emptying his bladder with resolution of his gross hematuria/clot retention, and that he should be evaluated by hospitalist to make further treatment plan. May need to broaden abx, may need icu.

## 2018-08-24 NOTE — PROGRESS NOTES
Consult called to Dr. Lopez at 0630.  Instructed me to pause CBI and manually irrigate diaz.  Cranberry colored urine retrieved without clots.  Patient tolerated well.  Will continue to monitor.

## 2018-08-24 NOTE — PROGRESS NOTES
Call placed to Dr Suazo as I haven't heard back from Dr Cloud- updated on RRT and pt's clinical picture. Dr Suazo states he will transfer pt to PCU for closer monitoring. Kelsey on MS3 updated on new orders for transfer.

## 2018-08-25 PROBLEM — R55 VASOVAGAL SYNCOPE: Status: ACTIVE | Noted: 2018-08-25

## 2018-08-25 LAB
ANION GAP SERPL CALC-SCNC: 11 MMOL/L
BACTERIA UR CULT: NO GROWTH
BASOPHILS # BLD AUTO: 0 K/UL
BASOPHILS NFR BLD: 0.4 %
BUN SERPL-MCNC: 10 MG/DL
CALCIUM SERPL-MCNC: 9.2 MG/DL
CHLORIDE SERPL-SCNC: 105 MMOL/L
CO2 SERPL-SCNC: 22 MMOL/L
CREAT SERPL-MCNC: 0.8 MG/DL
DIFFERENTIAL METHOD: ABNORMAL
EOSINOPHIL # BLD AUTO: 0 K/UL
EOSINOPHIL NFR BLD: 0.5 %
ERYTHROCYTE [DISTWIDTH] IN BLOOD BY AUTOMATED COUNT: 14 %
EST. GFR  (AFRICAN AMERICAN): >60 ML/MIN/1.73 M^2
EST. GFR  (NON AFRICAN AMERICAN): >60 ML/MIN/1.73 M^2
GLUCOSE SERPL-MCNC: 102 MG/DL
HCT VFR BLD AUTO: 36.1 %
HGB BLD-MCNC: 12.2 G/DL
LYMPHOCYTES # BLD AUTO: 1.3 K/UL
LYMPHOCYTES NFR BLD: 16.1 %
MCH RBC QN AUTO: 28.9 PG
MCHC RBC AUTO-ENTMCNC: 33.8 G/DL
MCV RBC AUTO: 86 FL
MONOCYTES # BLD AUTO: 0.8 K/UL
MONOCYTES NFR BLD: 9.6 %
NEUTROPHILS # BLD AUTO: 5.8 K/UL
NEUTROPHILS NFR BLD: 73.4 %
PLATELET # BLD AUTO: 263 K/UL
PMV BLD AUTO: 7.8 FL
POTASSIUM SERPL-SCNC: 3.8 MMOL/L
RBC # BLD AUTO: 4.23 M/UL
SODIUM SERPL-SCNC: 138 MMOL/L
TROPONIN I SERPL DL<=0.01 NG/ML-MCNC: <0.006 NG/ML
WBC # BLD AUTO: 7.9 K/UL

## 2018-08-25 PROCEDURE — 25000003 PHARM REV CODE 250: Performed by: UROLOGY

## 2018-08-25 PROCEDURE — 25000003 PHARM REV CODE 250: Performed by: FAMILY MEDICINE

## 2018-08-25 PROCEDURE — 36415 COLL VENOUS BLD VENIPUNCTURE: CPT

## 2018-08-25 PROCEDURE — 85025 COMPLETE CBC W/AUTO DIFF WBC: CPT

## 2018-08-25 PROCEDURE — 25000003 PHARM REV CODE 250: Performed by: INTERNAL MEDICINE

## 2018-08-25 PROCEDURE — G0378 HOSPITAL OBSERVATION PER HR: HCPCS

## 2018-08-25 PROCEDURE — 80048 BASIC METABOLIC PNL TOTAL CA: CPT

## 2018-08-25 PROCEDURE — 84484 ASSAY OF TROPONIN QUANT: CPT

## 2018-08-25 PROCEDURE — 63600175 PHARM REV CODE 636 W HCPCS: Performed by: FAMILY MEDICINE

## 2018-08-25 RX ORDER — LISINOPRIL 10 MG/1
10 TABLET ORAL DAILY
Status: DISCONTINUED | OUTPATIENT
Start: 2018-08-25 | End: 2018-08-25

## 2018-08-25 RX ORDER — LISINOPRIL 10 MG/1
20 TABLET ORAL DAILY
Status: DISCONTINUED | OUTPATIENT
Start: 2018-08-26 | End: 2018-08-26 | Stop reason: HOSPADM

## 2018-08-25 RX ADMIN — HYDRALAZINE HYDROCHLORIDE 25 MG: 25 TABLET, FILM COATED ORAL at 06:08

## 2018-08-25 RX ADMIN — PIPERACILLIN AND TAZOBACTAM 3.38 G: 3; .375 INJECTION, POWDER, LYOPHILIZED, FOR SOLUTION INTRAVENOUS; PARENTERAL at 01:08

## 2018-08-25 RX ADMIN — PIPERACILLIN AND TAZOBACTAM 3.38 G: 3; .375 INJECTION, POWDER, LYOPHILIZED, FOR SOLUTION INTRAVENOUS; PARENTERAL at 03:08

## 2018-08-25 RX ADMIN — PIPERACILLIN AND TAZOBACTAM 3.38 G: 3; .375 INJECTION, POWDER, LYOPHILIZED, FOR SOLUTION INTRAVENOUS; PARENTERAL at 08:08

## 2018-08-25 RX ADMIN — HYDRALAZINE HYDROCHLORIDE 25 MG: 25 TABLET, FILM COATED ORAL at 03:08

## 2018-08-25 RX ADMIN — HYDRALAZINE HYDROCHLORIDE 25 MG: 25 TABLET, FILM COATED ORAL at 09:08

## 2018-08-25 RX ADMIN — SODIUM CHLORIDE: 0.9 INJECTION, SOLUTION INTRAVENOUS at 09:08

## 2018-08-25 RX ADMIN — LISINOPRIL 10 MG: 10 TABLET ORAL at 11:08

## 2018-08-25 RX ADMIN — PIPERACILLIN AND TAZOBACTAM 3.38 G: 3; .375 INJECTION, POWDER, LYOPHILIZED, FOR SOLUTION INTRAVENOUS; PARENTERAL at 09:08

## 2018-08-25 RX ADMIN — TAMSULOSIN HYDROCHLORIDE 0.4 MG: 0.4 CAPSULE ORAL at 08:08

## 2018-08-25 NOTE — SUBJECTIVE & OBJECTIVE
Interval History: Patient had two episodes of small amount of blood in urine overnight that resolved. No blood in urine this morning. He remains hypertensive with BP >180s systolic.  Had syncopal episode with collapse yesterday evening with associated low blood pressure, had not had BP meds yet.     Review of Systems   Constitutional: Negative for activity change, appetite change, chills, diaphoresis, fatigue, fever and unexpected weight change.   HENT: Negative for congestion, hearing loss, mouth sores, nosebleeds, sinus pressure, sore throat and trouble swallowing.    Eyes: Negative for photophobia, redness and visual disturbance.   Respiratory: Negative for apnea, cough, chest tightness, shortness of breath and wheezing.    Cardiovascular: Negative for chest pain, palpitations and leg swelling.   Gastrointestinal: Negative for abdominal distention, abdominal pain, blood in stool, constipation, diarrhea, nausea and vomiting.   Endocrine: Negative for polydipsia, polyphagia and polyuria.   Genitourinary: Positive for hematuria. Negative for decreased urine volume, difficulty urinating, dysuria, frequency and urgency.   Musculoskeletal: Negative for arthralgias, back pain, gait problem, myalgias and neck stiffness.   Neurological: Negative for dizziness, tremors, seizures, syncope, weakness, numbness and headaches.   Hematological: Negative for adenopathy. Does not bruise/bleed easily.   Psychiatric/Behavioral: Negative for agitation, behavioral problems, confusion and decreased concentration.     Objective:     Vital Signs (Most Recent):  Temp: 98.4 °F (36.9 °C) (08/25/18 0842)  Pulse: 98 (08/25/18 0842)  Resp: 18 (08/25/18 0842)  BP: (!) 175/99 (08/25/18 0842)  SpO2: 95 % (08/25/18 0842) Vital Signs (24h Range):  Temp:  [98.3 °F (36.8 °C)-99.3 °F (37.4 °C)] 98.4 °F (36.9 °C)  Pulse:  [] 98  Resp:  [16-22] 18  SpO2:  [95 %-100 %] 95 %  BP: ()/(53-99) 175/99     Weight: 83.2 kg (183 lb 6.4 oz)  Body mass  index is 29.6 kg/m².    Intake/Output Summary (Last 24 hours) at 8/25/2018 1021  Last data filed at 8/25/2018 0645  Gross per 24 hour   Intake 727.5 ml   Output 1050 ml   Net -322.5 ml      Physical Exam   Constitutional: He is oriented to person, place, and time. He appears well-developed and well-nourished. No distress.   HENT:   Head: Normocephalic and atraumatic.   Nose: Nose normal.   Mouth/Throat: No oropharyngeal exudate.   Eyes: EOM are normal. Pupils are equal, round, and reactive to light. Right eye exhibits no discharge. Left eye exhibits no discharge. No scleral icterus.   Neck: Normal range of motion. Neck supple. No JVD present. No tracheal deviation present. No thyromegaly present.   Cardiovascular: Normal rate, regular rhythm, normal heart sounds and intact distal pulses. Exam reveals no gallop and no friction rub.   No murmur heard.  Pulmonary/Chest: Effort normal and breath sounds normal. No stridor. No respiratory distress. He has no wheezes. He has no rales.   Abdominal: Soft. Bowel sounds are normal. He exhibits no distension and no mass. There is no tenderness. There is no guarding.   Musculoskeletal: Normal range of motion. He exhibits no edema, tenderness or deformity.   Neurological: He is alert and oriented to person, place, and time. No cranial nerve deficit. He exhibits normal muscle tone. Coordination normal.   Skin: Skin is warm and dry. Capillary refill takes less than 2 seconds. No rash noted. He is not diaphoretic. No erythema. No pallor.   Psychiatric: He has a normal mood and affect. His behavior is normal. Judgment and thought content normal.       Significant Labs: All pertinent labs within the past 24 hours have been reviewed.    Significant Imaging: I have reviewed and interpreted all pertinent imaging results/findings within the past 24 hours.

## 2018-08-25 NOTE — PROGRESS NOTES
"1530: Wife reported that the patient was feeling nauseated and was really sweaty. Noted patient to be very diaphoretic, states he feels nauseated and very dizzy, said, "I just don't feel right". RRT called due to patient unresponsive for approximately 15-20 seconds.   VS obtained, EKG completed, bladder scan completed, 14 ml urine. Blood sugar 144.   1700: Assisted patient back into bed from chair, patient reports dizziness, but not as bad as before.    1715: Spoke with Dr. Cloud. New orders received.   1830: Patient is resting quietly in bed, reports feeling better. Wife is at bedside.     "

## 2018-08-25 NOTE — PROGRESS NOTES
[facility] Medicine  Progress Note    Patient Name: Bruce Segovia  MRN: [id number]  Patient Class: OP- Observation   Admission Date: 8/23/2018  Length of Stay: 0 days  Attending Physician: Wendy Cloud MD  Primary Care Provider: Sam Smith MD      Subjective:     Principal Problem:Hematuria    HPI:  Mr. Segovia jyotsna 52 yo with hx of Renal calculi 4 years ago who presented to by transfer from [facility] in [place] for hematuria.  He started having hematuria with passage of large blood clots 3-4 days ago.  He had mild associated suprapubic pain, otherwise no wt loss, diarrhea, abdominal pain, nausea, vomiting, fever, chills, melena, hematochezia.  He had similar less severe problem 4 years ago at which time he had stones without intervention.  Father had prostate cancer, otherwise no malignancy hx in family. Denies tob, etoh, or illicit drugs.  At [place] CT scan showed 203 mm non-obstructing stone R midpole.  1.5 cm Left renal lesion possible representing calyceal diverticulum with milk of calcium, and large blood clot in bladder. He had 3 way diaz placed and bladder irrigation started, now with lan colored urine. WBC 13.4, HGB 13.9, plt 260. UA pending here at time of interview.     Hospital Course:  He was treated with continuous bladder irrigation. There was a large blood clot within the bladder. It eventually was able to be broken up and voided as evidenced by repeat CT. He was given IV abx for UTI. He required anti hypertensives. He was able to void after diaz removal.     Interval History:  Patient seen and examined at bedside with urology this afternoon. Diaz had recently been removed and he voided. He felt ready for discharge home. He was started on hydralazine for HTN. All orders were placed.     However, he had a syncopal episode with hypotension after he received his first dose of hydralazine. His WBC is still elevated so it was deemed that he remain  hospitalized for further observation.     Review of Systems   Respiratory: Negative for shortness of breath.    Cardiovascular: Negative for chest pain.   Gastrointestinal: Negative for abdominal pain.     Objective:     Vital Signs (Most Recent):  Temp: 99.3 °F (37.4 °C) (08/24/18 2002)  Pulse: 104 (08/24/18 2002)  Resp: 16 (08/24/18 2002)  BP: (!) 176/90 (08/24/18 2002)  SpO2: 97 % (08/24/18 2002) Vital Signs (24h Range):  Temp:  [98.7 °F (37.1 °C)-99.3 °F (37.4 °C)] 99.3 °F (37.4 °C)  Pulse:  [] 104  Resp:  [16-22] 16  SpO2:  [95 %-100 %] 97 %  BP: ()/(53-98) 176/90     Weight: 83.2 kg (183 lb 6.4 oz)  Body mass index is 29.6 kg/m².    Intake/Output Summary (Last 24 hours) at 8/25/2018 0035  Last data filed at 8/24/2018 0543  Gross per 24 hour   Intake --   Output 650 ml   Net -650 ml      Physical Exam   Constitutional: He appears well-developed and well-nourished. No distress.   HENT:   Head: Normocephalic and atraumatic.   Eyes: Conjunctivae are normal.   Neck: Neck supple. No JVD present.   Cardiovascular: Normal rate, regular rhythm and normal heart sounds.   Pulmonary/Chest: Effort normal and breath sounds normal.   Abdominal: Soft. Bowel sounds are normal. He exhibits no distension. There is no tenderness.   Musculoskeletal: He exhibits no edema.   Neurological: He is alert.   Psychiatric: He has a normal mood and affect. His behavior is normal.   Nursing note and vitals reviewed.       Significant Labs:   BMP:   Recent Labs   Lab  08/24/18   1503   GLU  134*   NA  136   K  3.5   CL  101   CO2  25   BUN  9   CREATININE  0.8   CALCIUM  8.8     CBC:   Recent Labs   Lab  08/24/18   1503   WBC  12.80*   HGB  12.9*   HCT  39.0*   PLT  265       Significant Imaging: I have reviewed all pertinent imaging results/findings within the past 24 hours.    Assessment/Plan:      * Hematuria    Outpt cysto on Tuesday. Urology following.           Vasovagal syncope    Likely secondary to hypotension. Consider  further imaging.           Elevated blood-pressure reading, without diagnosis of hypertension    Hypotension resolved. Still hypertensive. Monitor.           Urinary tract infection with hematuria    Rocephin changed to Zosyn. F/u Ucx.             VTE Risk Mitigation (From admission, onward)        Ordered     IP VTE LOW RISK PATIENT  Once      08/24/18 0000        Discharge held. Reassess tomorrow.   Wendy Cloud MD  Department of Hospital Medicine   [facility]

## 2018-08-25 NOTE — ASSESSMENT & PLAN NOTE
Hypotension resolved. Still hypertensive. Need better control of BP. No events on telemetry overnight. Need echocardiogram to evaluate for Hypertensive hypertrophy before discharge, since he will be sent on treatment for uncontrolled hypertension

## 2018-08-25 NOTE — PROGRESS NOTES
[facility] Medicine  Progress Note    Patient Name: Bruce Segovia  MRN: [id number]  Patient Class: OP- Observation   Admission Date: 8/23/2018  Length of Stay: 0 days  Attending Physician: Hiram Varma MD  Primary Care Provider: Sam Smith MD        Subjective:     Principal Problem:Hematuria    HPI:  Mr. Segovia jyotsna 54 yo with hx of Renal calculi 4 years ago who presented to by transfer from [facility] in [place] for hematuria.  He started having hematuria with passage of large blood clots 3-4 days ago.  He had mild associated suprapubic pain, otherwise no wt loss, diarrhea, abdominal pain, nausea, vomiting, fever, chills, melena, hematochezia.  He had similar less severe problem 4 years ago at which time he had stones without intervention.  Father had prostate cancer, otherwise no malignancy hx in family. Denies tob, etoh, or illicit drugs.  At [place] CT scan showed 203 mm non-obstructing stone R midpole.  1.5 cm Left renal lesion possible representing calyceal diverticulum with milk of calcium, and large blood clot in bladder. He had 3 way diaz placed and bladder irrigation started, now with lan colored urine. WBC 13.4, HGB 13.9, plt 260. UA pending here at time of interview.     Hospital Course:  He was treated with continuous bladder irrigation. There was a large blood clot within the bladder. It eventually was able to be broken up and voided as evidenced by repeat CT. He was given IV abx for UTI. He required anti hypertensives. He was able to void after diaz removal.     Interval History: Patient had two episodes of small amount of blood in urine overnight that resolved. No blood in urine this morning. He remains hypertensive with BP >180s systolic.  Had syncopal episode with collapse yesterday evening with associated low blood pressure, had not had BP meds yet.     Review of Systems   Constitutional: Negative for activity change, appetite change, chills,  diaphoresis, fatigue, fever and unexpected weight change.   HENT: Negative for congestion, hearing loss, mouth sores, nosebleeds, sinus pressure, sore throat and trouble swallowing.    Eyes: Negative for photophobia, redness and visual disturbance.   Respiratory: Negative for apnea, cough, chest tightness, shortness of breath and wheezing.    Cardiovascular: Negative for chest pain, palpitations and leg swelling.   Gastrointestinal: Negative for abdominal distention, abdominal pain, blood in stool, constipation, diarrhea, nausea and vomiting.   Endocrine: Negative for polydipsia, polyphagia and polyuria.   Genitourinary: Positive for hematuria. Negative for decreased urine volume, difficulty urinating, dysuria, frequency and urgency.   Musculoskeletal: Negative for arthralgias, back pain, gait problem, myalgias and neck stiffness.   Neurological: Negative for dizziness, tremors, seizures, syncope, weakness, numbness and headaches.   Hematological: Negative for adenopathy. Does not bruise/bleed easily.   Psychiatric/Behavioral: Negative for agitation, behavioral problems, confusion and decreased concentration.     Objective:     Vital Signs (Most Recent):  Temp: 98.4 °F (36.9 °C) (08/25/18 0842)  Pulse: 98 (08/25/18 0842)  Resp: 18 (08/25/18 0842)  BP: (!) 175/99 (08/25/18 0842)  SpO2: 95 % (08/25/18 0842) Vital Signs (24h Range):  Temp:  [98.3 °F (36.8 °C)-99.3 °F (37.4 °C)] 98.4 °F (36.9 °C)  Pulse:  [] 98  Resp:  [16-22] 18  SpO2:  [95 %-100 %] 95 %  BP: ()/(53-99) 175/99     Weight: 83.2 kg (183 lb 6.4 oz)  Body mass index is 29.6 kg/m².    Intake/Output Summary (Last 24 hours) at 8/25/2018 1021  Last data filed at 8/25/2018 0645  Gross per 24 hour   Intake 727.5 ml   Output 1050 ml   Net -322.5 ml      Physical Exam   Constitutional: He is oriented to person, place, and time. He appears well-developed and well-nourished. No distress.   HENT:   Head: Normocephalic and atraumatic.   Nose: Nose normal.    Mouth/Throat: No oropharyngeal exudate.   Eyes: EOM are normal. Pupils are equal, round, and reactive to light. Right eye exhibits no discharge. Left eye exhibits no discharge. No scleral icterus.   Neck: Normal range of motion. Neck supple. No JVD present. No tracheal deviation present. No thyromegaly present.   Cardiovascular: Normal rate, regular rhythm, normal heart sounds and intact distal pulses. Exam reveals no gallop and no friction rub.   No murmur heard.  Pulmonary/Chest: Effort normal and breath sounds normal. No stridor. No respiratory distress. He has no wheezes. He has no rales.   Abdominal: Soft. Bowel sounds are normal. He exhibits no distension and no mass. There is no tenderness. There is no guarding.   Musculoskeletal: Normal range of motion. He exhibits no edema, tenderness or deformity.   Neurological: He is alert and oriented to person, place, and time. No cranial nerve deficit. He exhibits normal muscle tone. Coordination normal.   Skin: Skin is warm and dry. Capillary refill takes less than 2 seconds. No rash noted. He is not diaphoretic. No erythema. No pallor.   Psychiatric: He has a normal mood and affect. His behavior is normal. Judgment and thought content normal.       Significant Labs: All pertinent labs within the past 24 hours have been reviewed.    Significant Imaging: I have reviewed and interpreted all pertinent imaging results/findings within the past 24 hours.    Assessment/Plan:      * Hematuria    Outpt cysto on Tuesday. Urology following.           Vasovagal syncope    Likely secondary to hypotension, however have not ruled out cardiogenic causes. Will obtain echocardiogram.  Telemetry without events other than slight tachycardia on hydralazine.           Elevated blood-pressure reading, without diagnosis of hypertension    Hypotension resolved. Still hypertensive. Need better control of BP. No events on telemetry overnight. Need echocardiogram to evaluate for Hypertensive  hypertrophy before discharge, since he will be sent on treatment for uncontrolled hypertension          Urinary tract infection with hematuria    Rocephin changed to Zosyn. F/u Ucx.             VTE Risk Mitigation (From admission, onward)        Ordered     IP VTE LOW RISK PATIENT  Once      08/24/18 0000              Hiram Varma MD  Department of Hospital Medicine   [facility]

## 2018-08-25 NOTE — SUBJECTIVE & OBJECTIVE
Interval History:  Patient seen and examined at bedside with urology this afternoon. León had recently been removed and he voided. He felt ready for discharge home. He was started on hydralazine for HTN. All orders were placed.     However, he had a syncopal episode with hypotension after he received his first dose of hydralazine. His WBC is still elevated so it was deemed that he remain hospitalized for further observation.     Review of Systems   Respiratory: Negative for shortness of breath.    Cardiovascular: Negative for chest pain.   Gastrointestinal: Negative for abdominal pain.     Objective:     Vital Signs (Most Recent):  Temp: 99.3 °F (37.4 °C) (08/24/18 2002)  Pulse: 104 (08/24/18 2002)  Resp: 16 (08/24/18 2002)  BP: (!) 176/90 (08/24/18 2002)  SpO2: 97 % (08/24/18 2002) Vital Signs (24h Range):  Temp:  [98.7 °F (37.1 °C)-99.3 °F (37.4 °C)] 99.3 °F (37.4 °C)  Pulse:  [] 104  Resp:  [16-22] 16  SpO2:  [95 %-100 %] 97 %  BP: ()/(53-98) 176/90     Weight: 83.2 kg (183 lb 6.4 oz)  Body mass index is 29.6 kg/m².    Intake/Output Summary (Last 24 hours) at 8/25/2018 0035  Last data filed at 8/24/2018 0543  Gross per 24 hour   Intake --   Output 650 ml   Net -650 ml      Physical Exam   Constitutional: He appears well-developed and well-nourished. No distress.   HENT:   Head: Normocephalic and atraumatic.   Eyes: Conjunctivae are normal.   Neck: Neck supple. No JVD present.   Cardiovascular: Normal rate, regular rhythm and normal heart sounds.   Pulmonary/Chest: Effort normal and breath sounds normal.   Abdominal: Soft. Bowel sounds are normal. He exhibits no distension. There is no tenderness.   Musculoskeletal: He exhibits no edema.   Neurological: He is alert.   Psychiatric: He has a normal mood and affect. His behavior is normal.   Nursing note and vitals reviewed.       Significant Labs:   BMP:   Recent Labs   Lab  08/24/18   1503   GLU  134*   NA  136   K  3.5   CL  101   CO2  25   BUN  9    CREATININE  0.8   CALCIUM  8.8     CBC:   Recent Labs   Lab  08/24/18   1503   WBC  12.80*   HGB  12.9*   HCT  39.0*   PLT  265       Significant Imaging: I have reviewed all pertinent imaging results/findings within the past 24 hours.

## 2018-08-25 NOTE — ASSESSMENT & PLAN NOTE
Likely secondary to hypotension, however have not ruled out cardiogenic causes. Will obtain echocardiogram.  Telemetry without events other than slight tachycardia on hydralazine.

## 2018-08-25 NOTE — PLAN OF CARE
Problem: Patient Care Overview  Goal: Plan of Care Review  Outcome: Ongoing (interventions implemented as appropriate)  Plan of care reviewed with pt at beginning of shift.  Pt/family verbalized understanding. Hourly/ Q2 hourly rounds completed on this pt throughout shift.  Pain monitored, restroom offered, repositions self. safety maintained.  Patient has remained free from fall/injury, no skin breakdown noted.  Side rails up x2, bed in locked and lowest position, call light kept within reach.  Needs attended to, will continue to monitor/ update as indicated

## 2018-08-26 VITALS
HEART RATE: 82 BPM | DIASTOLIC BLOOD PRESSURE: 86 MMHG | BODY MASS INDEX: 29.41 KG/M2 | TEMPERATURE: 97 F | HEIGHT: 66 IN | RESPIRATION RATE: 18 BRPM | OXYGEN SATURATION: 99 % | SYSTOLIC BLOOD PRESSURE: 160 MMHG | WEIGHT: 183 LBS

## 2018-08-26 LAB
AORTIC ROOT ANNULUS: 3.21 CM
AORTIC VALVE CUSP SEPERATION: 2.51 CM
AV MEAN GRADIENT: 3.71 MMHG
AV PEAK GRADIENT: 7.27 MMHG
AV VALVE AREA: 2.85 CM2
BSA FOR ECHO PROCEDURE: 1.97 M2
CV ECHO LV RWT: 0.6 CM
DOP CALC AO PEAK VEL: 1.35 M/S
DOP CALC AO VTI: 23.15 CM
DOP CALC LVOT AREA: 3.59 CM2
DOP CALC LVOT DIAMETER: 2.14 CM
DOP CALC LVOT STROKE VOLUME: 66 CM3
DOP CALCLVOT PEAK VEL VTI: 18.36 CM
E WAVE DECELERATION TIME: 236.13 MSEC
E/A RATIO: 0.8
E/E' RATIO: 8.71
ECHO LV POSTERIOR WALL: 1.22 CM (ref 0.6–1.1)
FRACTIONAL SHORTENING: 28 % (ref 28–44)
INTERVENTRICULAR SEPTUM: 1.24 CM (ref 0.6–1.1)
IVRT: 0.12 MSEC
LEFT ATRIUM SIZE: 3.32 CM
LEFT INTERNAL DIMENSION IN SYSTOLE: 2.95 CM (ref 2.1–4)
LEFT VENTRICLE MASS INDEX: 89.8 G/M2
LEFT VENTRICULAR INTERNAL DIMENSION IN DIASTOLE: 4.08 CM (ref 3.5–6)
LEFT VENTRICULAR MASS: 176.83 G
LV LATERAL E/E' RATIO: 9.25
LV SEPTAL E/E' RATIO: 8.22
MV PEAK A VEL: 0.93 M/S
MV PEAK E VEL: 0.74 M/S
MV STENOSIS PRESSURE HALF TIME: 68.48 MS
MV VALVE AREA P 1/2 METHOD: 3.21 CM2
PISA TR MAX VEL: 2.56 M/S
PULM VEIN A" WAVE DURATION": 88 MSEC
PV PEAK GRADIENT: 3.06 MMHG
PV PEAK VELOCITY: 0.88 CM/S
RA PRESSURE: 3 MMHG
RIGHT VENTRICULAR END-DIASTOLIC DIMENSION: 2.64 CM
TDI LATERAL: 0.08
TDI SEPTAL: 0.09
TDI: 0.09
TR MAX PG: 26.21 MMHG
TRICUSPID ANNULAR PLANE SYSTOLIC EXCURSION: 0.03 CM
TV REST PULMONARY ARTERY PRESSURE: 29.21 MMHG

## 2018-08-26 PROCEDURE — 25000003 PHARM REV CODE 250: Performed by: UROLOGY

## 2018-08-26 PROCEDURE — 63600175 PHARM REV CODE 636 W HCPCS: Performed by: FAMILY MEDICINE

## 2018-08-26 PROCEDURE — 25000003 PHARM REV CODE 250: Performed by: FAMILY MEDICINE

## 2018-08-26 PROCEDURE — 25000003 PHARM REV CODE 250: Performed by: INTERNAL MEDICINE

## 2018-08-26 PROCEDURE — G0378 HOSPITAL OBSERVATION PER HR: HCPCS

## 2018-08-26 RX ORDER — TAMSULOSIN HYDROCHLORIDE 0.4 MG/1
0.4 CAPSULE ORAL DAILY
Qty: 30 CAPSULE | Refills: 0 | Status: SHIPPED | OUTPATIENT
Start: 2018-08-26 | End: 2018-11-19

## 2018-08-26 RX ORDER — HYDRALAZINE HYDROCHLORIDE 25 MG/1
25 TABLET, FILM COATED ORAL EVERY 8 HOURS
Qty: 90 TABLET | Refills: 0 | Status: SHIPPED | OUTPATIENT
Start: 2018-08-26 | End: 2018-11-19

## 2018-08-26 RX ORDER — CIPROFLOXACIN 500 MG/1
500 TABLET ORAL 2 TIMES DAILY
Qty: 10 TABLET | Refills: 0 | Status: SHIPPED | OUTPATIENT
Start: 2018-08-26 | End: 2018-08-31

## 2018-08-26 RX ORDER — LISINOPRIL 20 MG/1
20 TABLET ORAL DAILY
Qty: 90 TABLET | Refills: 0 | Status: SHIPPED | OUTPATIENT
Start: 2018-08-27 | End: 2019-08-27

## 2018-08-26 RX ADMIN — LISINOPRIL 20 MG: 10 TABLET ORAL at 08:08

## 2018-08-26 RX ADMIN — PIPERACILLIN AND TAZOBACTAM 3.38 G: 3; .375 INJECTION, POWDER, LYOPHILIZED, FOR SOLUTION INTRAVENOUS; PARENTERAL at 10:08

## 2018-08-26 RX ADMIN — TAMSULOSIN HYDROCHLORIDE 0.4 MG: 0.4 CAPSULE ORAL at 08:08

## 2018-08-26 RX ADMIN — PIPERACILLIN AND TAZOBACTAM 3.38 G: 3; .375 INJECTION, POWDER, LYOPHILIZED, FOR SOLUTION INTRAVENOUS; PARENTERAL at 04:08

## 2018-08-26 RX ADMIN — HYDRALAZINE HYDROCHLORIDE 25 MG: 25 TABLET, FILM COATED ORAL at 05:08

## 2018-08-26 NOTE — NURSING
Discharge instructions given to patient he verbalized understanding. All questions and concerns answered. Prescriptions given PIV and cardiac monitor discontinued pt left via wheelchair with all of his belongings at 12:00.

## 2018-08-26 NOTE — PLAN OF CARE
08/26/18 1101   Final Note   Assessment Type Final Discharge Note   Discharge Disposition Home

## 2018-08-26 NOTE — PLAN OF CARE
Problem: Patient Care Overview  Goal: Plan of Care Review  Outcome: Ongoing (interventions implemented as appropriate)  Plan of care reviewed with pt at beginning of shift- continue IVF, monitoring of BP and urine output/characteristics.  Pt/family verbalized understanding. Hourly/ Q2 hourly rounds completed on this pt throughout shift.  Pt has denied pain throughout shift, voiding with use of urinal.  One episode of bloody urine with clots present.  Subsequent void with slight pink color, followed by return of urine to clear yellow.  BP slightly improved from day shift.  Pt repositions self, safety maintained.  Patient has remained free from fall/injury, no skin breakdown noted.  Side rails up x2, bed in locked and lowest position, call light kept within reach.  Spouse remains at bedside  Needs attended to, will continue to monitor/ update as indicated

## 2018-08-27 ENCOUNTER — TELEPHONE (OUTPATIENT)
Dept: MEDSURG UNIT | Facility: HOSPITAL | Age: 53
End: 2018-08-27

## 2018-08-27 NOTE — PROGRESS NOTES
Spoke with Brianna at [facility], she stated that she will fax this to the office.

## 2018-08-27 NOTE — DISCHARGE SUMMARY
[facility] Medicine  Discharge Summary      Patient Name: Bruce Segovia  MRN: [id number]  Admission Date: 8/23/2018  Hospital Length of Stay: 0 days  Discharge Date and Time:  08/26/2018 10:01 PM  Attending Physician: No att. providers found   Discharging Provider: Hiram Varma MD  Primary Care Provider: Sam Smith MD      HPI:   Mr. Segovia is a 52 yo with hx of Renal calculi 4 years ago who presented to by transfer from [facility] in [place] for hematuria.  He started having hematuria with passage of large blood clots 3-4 days ago.  He had mild associated suprapubic pain, otherwise no wt loss, diarrhea, abdominal pain, nausea, vomiting, fever, chills, melena, hematochezia.  He had similar less severe problem 4 years ago at which time he had stones without intervention.  Father had prostate cancer, otherwise no malignancy hx in family. Denies tob, etoh, or illicit drugs.  At [place] CT scan showed 2-3 mm non-obstructing stone R midpole.  1.5 cm Left renal lesion possible representing calyceal diverticulum with milk of calcium, and large blood clot in bladder. He had 3 way diaz placed and bladder irrigation started, now with lan colored urine. WBC 13.4, HGB 13.9, plt 260. UA pending here at time of interview.     Procedure(s) (LRB):  CYSTOSCOPY (N/A)      Hospital Course:   Mr. Segovia is a 52 yo male with hx of renal calculi transferred from [facility] for hematuria. He came with 3 way diaz and underwent bladder irrigation for 24 hours. Diaz was removed and he had intermittent blood clot passage with intermittent clear urine with good urine output. At time of discharge on 8/24 he had syncope event while sitting. He was not standing up fast and had not taken blood pressure meds beforehand. He did have difficult to treat HTN during his admission and had lisinopril and hydralazine added to his medications.  Even immediately after syncope episode his blood  pressure was elevated. He was given IV abx for UTI and discharged with 5 days of cipro which would complete an 8 day course of antibiotics for UTI.  Urine culture was negative. He had outpatient cystoscopy scheduled with Dr. Lopez. He was monitored on telemetry for 48 hours without any acute events and no evidence of syncope. Echocardiogram showed mild hypertrophy, but normal EF.  Request was placed for cardiology follow up as outpatient. Syncopal episode was thought to be from either cardiogenic cause vs vasovagal.      Consults:     No new Assessment & Plan notes have been filed under this hospital service since the last note was generated.  Service: Hospital Medicine    Final Active Diagnoses:    Diagnosis Date Noted POA    PRINCIPAL PROBLEM:  Hematuria [R31.9] 08/23/2018 Yes    Vasovagal syncope [R55] 08/25/2018 No    Urinary tract infection with hematuria [N39.0, R31.9] 08/24/2018 Yes    Elevated blood-pressure reading, without diagnosis of hypertension [R03.0] 08/24/2018 Yes      Problems Resolved During this Admission:    Diagnosis Date Noted Date Resolved POA    Leukocytosis [D72.829] 08/23/2018 08/24/2018 Yes       Discharged Condition: good    Disposition: Home or Self Care    Follow Up:  Follow-up Information     Sam Smith MD In 1 week.    Specialty:  Family Medicine  Why:  hypertension  Contact information:  [address] [facility] [place] [address]  [phone]             Rodo Lopez MD On 8/28/2018.    Specialty:  Urology  Contact information:  [address] [place] [address]  [address]  [place] [address] [phone]             aSm Smith MD In 1 week.    Specialty:  Family Medicine  Contact information:  [address] [facility] [address] [facility] [address] [place] [address]  [phone]             Koko Ayers MD In 2 weeks.    Specialty:  Cardiology  Contact information:  [address]  [address]  [place] [address] [phone]                 Patient Instructions:      Diet Cardiac      Activity as tolerated       Significant Diagnostic Studies: Labs: All labs within the past 24 hours have been reviewed  Microbiology: Blood Culture No results found for: LABBLOO  Radiology: X-Ray: CXR: X-Ray Chest 1 View (CXR): No results found for this visit on 08/23/18.  CT scan: CT ABDOMEN PELVIS WITH CONTRAST: No results found for this visit on 08/23/18.    Pending Diagnostic Studies:     Procedure Component Value Units Date/Time    EKG 12-lead [[id number]]     Order Status:  Sent Lab Status:  No result     EKG 12-lead [[id number]]     Order Status:  Sent Lab Status:  No result          Medications:  Reconciled Home Medications:      Medication List      START taking these medications    ciprofloxacin HCl 500 MG tablet  Commonly known as:  CIPRO  Take 1 tablet (500 mg total) by mouth 2 (two) times daily. Start tonight for 5 days     * hydrALAZINE 25 MG tablet  Commonly known as:  APRESOLINE  Take 1 tablet (25 mg total) by mouth every 8 (eight) hours as needed (bp >140/90).     * hydrALAZINE 25 MG tablet  Commonly known as:  APRESOLINE  Take 1 tablet (25 mg total) by mouth every 8 (eight) hours.     lisinopril 20 MG tablet  Commonly known as:  PRINIVIL,ZESTRIL  Take 1 tablet (20 mg total) by mouth once daily.     tamsulosin 0.4 mg Cap  Commonly known as:  FLOMAX  Take 1 capsule (0.4 mg total) by mouth once daily.         * This list has 2 medication(s) that are the same as other medications prescribed for you. Read the directions carefully, and ask your doctor or other care provider to review them with you.                Indwelling Lines/Drains at time of discharge:   Lines/Drains/Airways          None          Time spent on the discharge of patient: 32 minutes  Patient was seen and examined on the date of discharge and determined to be suitable for discharge.         Hiram Varma MD  Department of Hospital Medicine  [facility]

## 2018-08-31 DIAGNOSIS — R03.0 ELEVATED BLOOD PRESSURE READING WITHOUT DIAGNOSIS OF HYPERTENSION: Primary | ICD-10-CM

## 2018-09-05 ENCOUNTER — OFFICE VISIT (OUTPATIENT)
Dept: CARDIOLOGY | Facility: CLINIC | Age: 53
End: 2018-09-05
Payer: COMMERCIAL

## 2018-09-05 VITALS
HEIGHT: 66 IN | DIASTOLIC BLOOD PRESSURE: 85 MMHG | BODY MASS INDEX: 29.87 KG/M2 | SYSTOLIC BLOOD PRESSURE: 132 MMHG | OXYGEN SATURATION: 98 % | WEIGHT: 185.88 LBS | HEART RATE: 101 BPM

## 2018-09-05 DIAGNOSIS — R03.0 ELEVATED BLOOD PRESSURE READING WITHOUT DIAGNOSIS OF HYPERTENSION: ICD-10-CM

## 2018-09-05 DIAGNOSIS — R55 VASOVAGAL SYNCOPE: ICD-10-CM

## 2018-09-05 DIAGNOSIS — E78.5 HYPERLIPIDEMIA, UNSPECIFIED HYPERLIPIDEMIA TYPE: Primary | ICD-10-CM

## 2018-09-05 PROCEDURE — 3008F BODY MASS INDEX DOCD: CPT | Mod: CPTII,S$GLB,, | Performed by: INTERNAL MEDICINE

## 2018-09-05 PROCEDURE — 99203 OFFICE O/P NEW LOW 30 MIN: CPT | Mod: S$GLB,,, | Performed by: INTERNAL MEDICINE

## 2018-09-05 PROCEDURE — 99999 PR PBB SHADOW E&M-EST. PATIENT-LVL III: CPT | Mod: PBBFAC,,, | Performed by: INTERNAL MEDICINE

## 2018-09-05 PROCEDURE — 93000 ELECTROCARDIOGRAM COMPLETE: CPT | Mod: S$GLB,,, | Performed by: INTERNAL MEDICINE

## 2018-09-05 NOTE — LETTER
September 5, 2018      Hiram Varma MD  [address] [place] [address]  [place] [address]           [place] [facility] - Cardiology  [address]  [place] [address]  Phone: [phone]          Patient: Bruce Segovia   MR Number: [id number]   YOB: 1965   Date of Visit: 9/5/2018       Dear Dr. Hiram Varma:    Thank you for referring Bruce Segovia to me for evaluation. Attached you will find relevant portions of my assessment and plan of care.    If you have questions, please do not hesitate to call me. I look forward to following Bruce Segovia along with you.    Sincerely,    Koko Ayers MD    Enclosure  CC:  No Recipients    If you would like to receive this communication electronically, please contact [email or web address] or [phone] to request more information on [employer] Link access.    For providers and/or their staff who would like to refer a patient to [facility], please contact us through our one-stop-shop provider referral line, [facility] , at [phone].    If you feel you have received this communication in error or would no longer like to receive these types of communications, please e-mail [email or web address]

## 2018-09-05 NOTE — PROGRESS NOTES
[facility]    CC:   Syncope  Chief Complaint   Patient presents with    Hospital Follow Up       Patient ID: Bruce Segovia is a 53 y.o. male with a past medical history of  hypertension    HPI   recently this gentleman was in the hospital with hematuria.  During this period of time he was found to be hypertensive and was started on lisinopril.  He had an episode of syncope when he tried to get up from his bed.  Rapid response was called.  There was no significant arrhythmias documented.   he he has had no further episodes of syncope.  He denies any chest pain, shortness of breath, orthopnea, PND, syncope or presyncope    Past Medical History:   Diagnosis Date    Hypertension     Kidney stone     approx 3-4 yrs ago     Past Surgical History:   Procedure Laterality Date    APPENDECTOMY      approx. 20 yrs ago     Social History     Socioeconomic History    Marital status:      Spouse name: Not on file    Number of children: Not on file    Years of education: Not on file    Highest education level: Not on file   Social Needs    Financial resource strain: Not on file    Food insecurity - worry: Not on file    Food insecurity - inability: Not on file    Transportation needs - medical: Not on file    Transportation needs - non-medical: Not on file   Occupational History    Not on file   Tobacco Use    Smoking status: Never Smoker    Smokeless tobacco: Never Used   Substance and Sexual Activity    Alcohol use: Not on file    Drug use: No    Sexual activity: Yes   Other Topics Concern    Not on file   Social History Narrative    Not on file     No family history on file.    Review of patient's allergies indicates:  No Known Allergies    Medication List with Changes/Refills   Current Medications    HYDRALAZINE (APRESOLINE) 25 MG TABLET    Take 1 tablet (25 mg total) by mouth every 8 (eight) hours as needed (bp >140/90).    HYDRALAZINE (APRESOLINE) 25 MG TABLET    Take 1 tablet (25  "mg total) by mouth every 8 (eight) hours.    LISINOPRIL (PRINIVIL,ZESTRIL) 20 MG TABLET    Take 1 tablet (20 mg total) by mouth once daily.    TAMSULOSIN (FLOMAX) 0.4 MG CAP    Take 1 capsule (0.4 mg total) by mouth once daily.       Review of Systems  Constitution: Denies chills, fever, and sweats.  HENT: Denies headaches or blurry vision.  Cardiovascular: Denies chest pain or irregular heart beat.  Respiratory: Denies cough or shortness of breath.  Gastrointestinal: Denies abdominal pain, nausea, or vomiting.  Musculoskeletal: Denies muscle cramps.  Neurological: Denies dizziness or focal weakness.  Psychiatric/Behavioral: Normal mental status.  Hematologic/Lymphatic: Denies bleeding problem or easy bruising/bleeding.  Skin: Denies rash or suspicious lesions    Physical Examination  /85   Pulse 101   Ht 5' 6" (1.676 m)   Wt 84.3 kg (185 lb 13.6 oz)   SpO2 98%   BMI 30.00 kg/m²     Constitutional: No acute distress, conversant  HEENT: Sclera anicteric, Pupils equal, round and reactive to light, extraocular motions intact, Oropharynx clear  Neck: No JVD, no carotid bruits  Cardiovascular: regular rate and rhythm, no murmur, rubs or gallops, normal S1/S2  Pulmonary: Clear to auscultation bilaterally  Abdominal: Abdomen soft, nontender, nondistended, positive bowel sounds  Extremities: No lower extremity edema,   Pulses:  Carotid pulses are 2+ on the right side, and 2+ on the left side.  Radial pulses are 2+ on the right side, and 2+ on the left side.      Skin: No ecchymosis, erythema, or ulcers  Psych: Alert and oriented x 3, appropriate affect  Neuro: CNII-XII intact, no focal deficits    Labs:  Most Recent Data  CBC:   Lab Results   Component Value Date    WBC 7.90 08/25/2018    HGB 12.2 (L) 08/25/2018    HCT 36.1 (L) 08/25/2018     08/25/2018    MCV 86 08/25/2018    RDW 14.0 08/25/2018     BMP:   Lab Results   Component Value Date     08/25/2018    K 3.8 08/25/2018     08/25/2018    " CO2 22 (L) 08/25/2018    BUN 10 08/25/2018    CREATININE 0.8 08/25/2018     08/25/2018    CALCIUM 9.2 08/25/2018     LFTS; No results found for: PROT, ALBUMIN, BILITOT, AST, ALKPHOS, ALT, GGT  COAGS: No results found for: INR, PROTIME, PTT  FLP: No results found for: CHOL, HDL, LDLCALC, TRIG, CHOLHDL  CARDIAC:   Lab Results   Component Value Date    TROPONINI <0.006 08/25/2018       Imaging:    EKG:  Normal sinus rhythm.    Echo:  Conclusion     · Left ventricle shows mild concentric hypertrophy.  · Left ventricle ejection fraction is normal at 55%  · Grade I (mild) left ventricular diastolic dysfunction consistent with impaired relaxation.  · LA pressure is normal.  · RV systolic function is normal.  · Left atrium is mildly dilated.  · Right atrium is mildly dilated.  · There is no stenosis of the Mitral Valve.  · Tricuspid valve shows mild regurgitation.  · Normal central venous pressure (3 mm Hg).  · The estimated PA systolic pressure is 29.21 mm Hg         I have personally reviewed these images and echo data    Assessment/Plan:  Bruce was seen today for hospital follow up.    Diagnoses and all orders for this visit:    Hyperlipidemia, unspecified hyperlipidemia type  -     Lipid panel; Future    Elevated blood pressure reading without diagnosis of hypertension  -     EKG 12-lead    Vasovagal syncope        His syncope was multifactorial.  It was likely related to his new antihypertensive medication along with his underlying presentation of urosepsis and hematuria.  He had an echo the reports are dictated above.  I do not think we need any further cardiac workup.  Recommendations regarding healthy lifestyle exercise and diet given to the patient.   We will perform a lipid panel as of surveillance test  Follow-up if symptoms worsen or fail to improve.            Koko Ayers MD,MRCP,RPVI,FACC,FSCAI.  Interventional Cardiology   Phone [phone]

## 2018-09-11 ENCOUNTER — HOSPITAL ENCOUNTER (OUTPATIENT)
Facility: AMBULARY SURGERY CENTER | Age: 53
Discharge: HOME | End: 2018-09-11
Attending: UROLOGY | Admitting: UROLOGY
Payer: COMMERCIAL

## 2018-09-11 VITALS
TEMPERATURE: 98 F | HEIGHT: 66 IN | OXYGEN SATURATION: 95 % | SYSTOLIC BLOOD PRESSURE: 151 MMHG | DIASTOLIC BLOOD PRESSURE: 101 MMHG | WEIGHT: 165 LBS | RESPIRATION RATE: 18 BRPM | HEART RATE: 95 BPM | BODY MASS INDEX: 26.52 KG/M2

## 2018-09-11 DIAGNOSIS — Z12.5 PROSTATE CANCER SCREENING: Primary | ICD-10-CM

## 2018-09-11 DIAGNOSIS — R31.0 GROSS HEMATURIA: ICD-10-CM

## 2018-09-11 PROCEDURE — 52000 CYSTOURETHROSCOPY: CPT | Performed by: UROLOGY

## 2018-09-11 PROCEDURE — 52000 CYSTOURETHROSCOPY: CPT | Mod: ,,, | Performed by: UROLOGY

## 2018-09-11 PROCEDURE — 88112 CYTOPATH CELL ENHANCE TECH: CPT | Performed by: PATHOLOGY

## 2018-09-11 RX ORDER — CIPROFLOXACIN 2 MG/ML
400 INJECTION, SOLUTION INTRAVENOUS
Status: CANCELLED | OUTPATIENT
Start: 2018-09-11

## 2018-09-11 RX ORDER — LIDOCAINE HYDROCHLORIDE 20 MG/ML
JELLY TOPICAL ONCE
Status: COMPLETED | OUTPATIENT
Start: 2018-09-11 | End: 2018-09-11

## 2018-09-11 RX ORDER — CIPROFLOXACIN 500 MG/1
500 TABLET ORAL 2 TIMES DAILY
Qty: 4 TABLET | Refills: 0 | Status: SHIPPED | OUTPATIENT
Start: 2018-09-11 | End: 2018-09-25 | Stop reason: ALTCHOICE

## 2018-09-11 RX ORDER — WATER 1000 ML/1000ML
INJECTION, SOLUTION INTRAVENOUS
Status: DISCONTINUED | OUTPATIENT
Start: 2018-09-11 | End: 2018-09-11 | Stop reason: HOSPADM

## 2018-09-11 RX ORDER — LIDOCAINE HYDROCHLORIDE 20 MG/ML
JELLY TOPICAL
Status: DISPENSED
Start: 2018-09-11 | End: 2018-09-12

## 2018-09-11 RX ADMIN — LIDOCAINE HYDROCHLORIDE 5 ML: 20 JELLY TOPICAL at 01:09

## 2018-09-11 NOTE — DISCHARGE INSTRUCTIONS
Cystoscopy    Cystoscopy is a procedure that lets your doctor look directly inside your urethra and bladder. It can be used to:  · Help diagnose a problem with your urethra, bladder, or kidneys.  · Take a sample (biopsy) of bladder or urethral tissue.  · Treat certain problems (such as removing kidney stones).  · Place a stent to bypass an obstruction.  · Take special X-rays of the kidneys.  Based on the findings, your doctor may recommend other tests or treatments.  What is a cystoscope?  A cystoscope is a telescope-like instrument that contains lenses and fiberoptics (small glass wires that make bright light). The cystoscope may be straight and rigid, or flexible to bend around curves in the urethra. The doctor may look directly into the cystoscope, or project the image onto a monitor.  Getting ready  · Ask your doctor if you should stop taking any medicines before the procedure.  · Ask whether you should avoid eating or drinking anything after midnight before the procedure.  · Follow any other instructions your doctor gives you.  Tell your doctor before the exam if you:  · Take any medicines, such as aspirin or blood thinners  · Have allergies to any medicines  · Are pregnant   The procedure  Cystoscopy is done in the doctors office, surgery center, or hospital. The doctor and a nurse are present during the procedure. It takes only a few minutes, longer if a biopsy, X-ray, or treatment needs to be done.  During the procedure:  · You lie on an exam table on your back, knees bent and legs apart. You are covered with a drape.  · Your urethra and the area around it are washed. Anesthetic jelly may be applied to numb the urethra. Other pain medicine is usually not needed. In some cases, you may be offered a mild sedative to help you relax. If a more extensive procedure is to be done, such as a biopsy or kidney stone removal, general anesthesia may be needed.  · The cystoscope is inserted. A sterile fluid is put  into the bladder to expand it. You may feel pressure from this fluid.  · When the procedure is done, the cystoscope is removed.  After the procedure  If you had a sedative, general anesthesia, or spinal anesthesia, you must have someone drive you home. Once youre home:  · Drink plenty of fluids.  · You may have burning or light bleeding when you urinate--this is normal.  · Medicines may be prescribed to ease any discomfort or prevent infection. Take these as directed.  · Call your doctor if you have heavy bleeding or blood clots, burning that lasts more than a day, a fever over 100°F  (38° C), or trouble urinating.  Date Last Reviewed: 1/1/2017  © 4232-5974 [employer]. [address] [place] [address], [place] [address]. All rights reserved. This information is not intended as a substitute for professional medical care. Always follow your healthcare professional's instructions.

## 2018-09-11 NOTE — INTERVAL H&P NOTE
The patient has been examined and the H&P has been reviewed:    No changes to above  ucx negative, ct urogram unremarkable  Proceed with cystoscopy as planned    Anesthesia/Surgery risks, benefits and alternative options discussed and understood by patient/family.          Active Hospital Problems    Diagnosis  POA    Gross hematuria [R31.0]  Yes      Resolved Hospital Problems   No resolved problems to display.

## 2018-09-12 NOTE — OP NOTE
[facility] Operative Report / Brief Discharge Note     Date: 9/11/2018     Staff Surgeon: Rodo Lopez MD     Pre-Op Diagnosis: gross hematuria     Post-Op Diagnosis: same     Procedure(s) Performed:   Cystoscopy, flexible     Specimen(s): urine cytology     Anesthesia: Local anesthesia, urojet     Findings:   Areas of abnormal hypervascular string-like projections into bladder lumen especially at/near dome, large erythematous hypervascular patches on lateral walls, significant enlarged obstructing prostate 4-5cm prostatic urethra with multiple abnormal red and early papillary projections from urothelium into prostatic urethra, and significant hypervascularity with varicosities over median lobe and abnormal projections from prostate at anterior bladder neck with bullous/erythematous overlying urothelium     Estimated Blood Loss: none     Drains: none     Complications: none     Indications for procedure:  54 yo M with recent hospital presentation for gross hematuria and clot retention. After clot evacuation, CT urogram unremarkable. Ucxs negative. Did have GH for a few days after discharge but urine has been clear since. Prior to admission, he did note a few months of having more difficulty passing his urine and a weaker urinary stream. Has since been on flomax which he notes has improved his urinary stream.     Procedure in detail:  After informed consent, the patient was prepped and drapped in standard cystoscopic fashion and 2% xylocaine jelly was instilled into the urethra. A flexible cystoscope was passed into the bladder via the urethra.       Anterior urethra appeared normal. Prostatic urethra with significant obstruction and multiple abnormal findings as noted above including multiple variable abnormal lesions projecting into lumen.      The bladder was then systematically inspected. Multiple mucosal abnormalities found as above without gross tumor, though multiple erythematous patched for which cis  could not be ruled out and hypervascular projections into lumen diffusely and worse at dome. Mild trabeculations of bladder wall.     Bilateral ureteral orifices seen in orthotopic position on trigone with clear efflux, as well as on retrfolexion, at which time significant abnormal median lobe with similar hypervascular projections as bladder dome as well as significant varicosities and abnormal projections from bladder neck into lumen especially anteriorly as noted above.     Pictures of abnormalities captured and placed on patient's chart. He tolerated the procedure well with no complications.      Disposition:   Given his bladder and prostatic findings, he will need further evaluation. We did discuss transurethral resection of abnormal prostatic urethral urothelial lesions both within lumen and at bladder neck for pathologic evaluation and can perform concurrent full resection/TURP to remove hypervascular tissue and relieve obstruction as also affects voiding. He will need formal bladder biopsy and fulguration of the multiple abnormal areas in bladder regardless. Could do this all concurrently. However before any resection of prostate, given significant visualized abnormality, advised baseline psa assessment as he has not had one, and if abnormal would precede the above with prostate biopsy formally, or possibly concurrent with bladder biopsy.   He has upcoming travel and asked to defer workup and OR management until he returns and therefore cysto/bladder biopsy/TURBT/TURP booked for 10/11/18 and will proceed at that time assuming normal psa. All questions answered and appropriate informed consent obtained. He has had recent cardiac evaluation by Dr Ayers since hospital discharge noted to be stable.      Discharge:  Home today s/p uncomplicated procedure  Diet: resume previous  Follow up - OR 10/11/18 for bladder biopsy/TURP pending psa  - preop early in week of return (~9/24) with ucx and PSA at that  time.  Instructions - drink plenty of water, take abx as directed, may see blood in urine  Meds:     Medication List      START taking these medications    ciprofloxacin HCl 500 MG tablet  Commonly known as:  CIPRO  Take 1 tablet (500 mg total) by mouth 2 (two) times daily.        CONTINUE taking these medications    hydrALAZINE 25 MG tablet  Commonly known as:  APRESOLINE  Take 1 tablet (25 mg total) by mouth every 8 (eight) hours.     lisinopril 20 MG tablet  Commonly known as:  PRINIVIL,ZESTRIL  Take 1 tablet (20 mg total) by mouth once daily.     tamsulosin 0.4 mg Cap  Commonly known as:  FLOMAX  Take 1 capsule (0.4 mg total) by mouth once daily.           Where to Get Your Medications      [id number]  [place], [place] - [address] AT [facility] [address]  [address], [place] [address]    Phone:  [phone]   · ciprofloxacin HCl 500 MG tablet

## 2018-09-24 ENCOUNTER — TELEPHONE (OUTPATIENT)
Dept: UROLOGY | Facility: CLINIC | Age: 53
End: 2018-09-24

## 2018-09-24 NOTE — TELEPHONE ENCOUNTER
----- Message from Brigida Wang sent at 9/24/2018  9:39 AM CDT -----  Pt is calling to  Reschedule surgery // please call [phone]

## 2018-09-24 NOTE — TELEPHONE ENCOUNTER
Patient would like to reschedule TURP/Bladder bx to the following week, 10/18.    Please confirm this is ok. Will notify surgery and move in book.

## 2018-09-25 ENCOUNTER — HOSPITAL ENCOUNTER (OUTPATIENT)
Dept: PREADMISSION TESTING | Facility: HOSPITAL | Age: 53
Discharge: HOME | End: 2018-09-25
Attending: UROLOGY
Payer: COMMERCIAL

## 2018-09-25 ENCOUNTER — TELEPHONE (OUTPATIENT)
Dept: UROLOGY | Facility: CLINIC | Age: 53
End: 2018-09-25

## 2018-09-25 VITALS — WEIGHT: 180 LBS | BODY MASS INDEX: 28.25 KG/M2 | HEIGHT: 67 IN

## 2018-09-25 DIAGNOSIS — R97.20 ELEVATED PSA: Primary | ICD-10-CM

## 2018-09-25 DIAGNOSIS — R31.0 GROSS HEMATURIA: ICD-10-CM

## 2018-09-25 PROCEDURE — 99900104 DSU ONLY-NO CHARGE-EA ADD'L HR (STAT)

## 2018-09-25 PROCEDURE — 99900103 DSU ONLY-NO CHARGE-INITIAL HR (STAT)

## 2018-09-25 RX ORDER — GENTAMICIN SULFATE 40 MG/ML
80 INJECTION, SOLUTION INTRAMUSCULAR; INTRAVENOUS ONCE
Status: CANCELLED | OUTPATIENT
Start: 2018-09-28

## 2018-09-25 RX ORDER — DIAZEPAM 5 MG/1
5 TABLET ORAL ONCE
Qty: 1 TABLET | Refills: 0 | Status: ON HOLD | OUTPATIENT
Start: 2018-09-25 | End: 2018-09-28 | Stop reason: HOSPADM

## 2018-09-25 RX ORDER — LIDOCAINE HYDROCHLORIDE 10 MG/ML
10 INJECTION, SOLUTION EPIDURAL; INFILTRATION; INTRACAUDAL; PERINEURAL ONCE
Status: CANCELLED | OUTPATIENT
Start: 2018-09-25 | End: 2018-09-25

## 2018-09-25 RX ORDER — CIPROFLOXACIN 500 MG/1
500 TABLET ORAL 2 TIMES DAILY
Qty: 6 TABLET | Refills: 0 | Status: ON HOLD | OUTPATIENT
Start: 2018-09-25 | End: 2018-10-18 | Stop reason: SDUPTHER

## 2018-09-25 NOTE — TELEPHONE ENCOUNTER
Spoke to patient to discuss psa of 7.1    I had a long discussion with the patient regarding the natural history of prostate cancer as well as when diagnostics are indicated. We also discussed differential for elevated psa which also includes benign enlargement and prostatitis.  We did discuss that an elevated PSA is considered a PSA greater than 4 because statistically 20% of people in this value range are found to have prostate cancer, however we also discussed a bit about PSA velocity and trends and age specific psa elevations.    Given the abnormal appearance of his prostate on cystoscopy, and his psa of 7.1 which is a true elevation and an age specific elevation, I therefore recommended prostate biopsy to evaluate for underlying malignancy.  We had previously discussed this as a possibility to r/o underlying prostatic malignancy before proceeding with any transurethral resection of prostate.    We discussed biopsy and in detail, including 1% risk of infectious complications including sepsis but that it is an otherwise safe diagnostic procedure with expected hematuria hematospermia after.      I went over the details of a transrectal ultrasound-guided biopsy of the prostate, and described the technique in detail.   The patient will be given local injection anesthetic to block the prostate so as to minimize any pain. 12-14 biopsy specimens will be taken. These will be sent for histopathology analysis.   Complications include bleeding, fever and chills. He was also instructed to watch for any signs of fever. If he does have any fever or chills after, he was advised to come to the emergency room right away for intravenous antibiotics and possible admission to the hospital. He is to refrain from any strenuous activity including sexual activity for the next 72 hours after biopsy. He was also advised that he may have blood while urinating, during bowel movements as well as during ejaculations. He was given a  prebiopsy/postbiopsy instruction sheet was reminding him to avoid aspirin and blood thinners for 7 days prior, take the Rxed antibiotics the day before, day of, day after biopsy, and perform a fleet enema at home morning of biopsy.      We did discuss the diagnosis of vasovagal syncope in his chart and the possible vasovagal reaction with trus-bx and advised he have a ride to and from, and have also Rxed valium to take at check in.    OR plan still for 10/18/18 unless needs to change as dictated by pathology    All questions he had were answered in detail.

## 2018-09-28 ENCOUNTER — HOSPITAL ENCOUNTER (OUTPATIENT)
Facility: AMBULARY SURGERY CENTER | Age: 53
Discharge: HOME | End: 2018-09-28
Attending: UROLOGY | Admitting: UROLOGY
Payer: COMMERCIAL

## 2018-09-28 DIAGNOSIS — R97.20 ELEVATED PSA: ICD-10-CM

## 2018-09-28 PROCEDURE — 76872 US TRANSRECTAL: CPT | Mod: 26,,, | Performed by: UROLOGY

## 2018-09-28 PROCEDURE — 55700 PR BIOPSY OF PROSTATE,NEEDLE/PUNCH: CPT | Mod: ,,, | Performed by: UROLOGY

## 2018-09-28 PROCEDURE — 88305 TISSUE EXAM BY PATHOLOGIST: CPT | Performed by: PATHOLOGY

## 2018-09-28 PROCEDURE — 55700 HC PROSTATE NEEDLE BIOPSY: CPT | Performed by: UROLOGY

## 2018-09-28 PROCEDURE — 76942 ECHO GUIDE FOR BIOPSY: CPT | Mod: 26,59,, | Performed by: UROLOGY

## 2018-09-28 PROCEDURE — 88305 TISSUE EXAM BY PATHOLOGIST: CPT | Mod: 26,,, | Performed by: PATHOLOGY

## 2018-09-28 PROCEDURE — 76872 US TRANSRECTAL: CPT | Performed by: UROLOGY

## 2018-09-28 RX ORDER — LIDOCAINE HYDROCHLORIDE 10 MG/ML
10 INJECTION, SOLUTION EPIDURAL; INFILTRATION; INTRACAUDAL; PERINEURAL ONCE
Status: DISCONTINUED | OUTPATIENT
Start: 2018-09-28 | End: 2018-09-28 | Stop reason: HOSPADM

## 2018-09-28 RX ORDER — LIDOCAINE HYDROCHLORIDE 10 MG/ML
INJECTION, SOLUTION EPIDURAL; INFILTRATION; INTRACAUDAL; PERINEURAL
Status: DISCONTINUED | OUTPATIENT
Start: 2018-09-28 | End: 2018-09-28 | Stop reason: HOSPADM

## 2018-09-28 RX ORDER — GENTAMICIN SULFATE 40 MG/ML
80 INJECTION, SOLUTION INTRAMUSCULAR; INTRAVENOUS ONCE
Status: COMPLETED | OUTPATIENT
Start: 2018-09-28 | End: 2018-09-28

## 2018-09-28 RX ADMIN — GENTAMICIN SULFATE 80 MG: 40 INJECTION, SOLUTION INTRAMUSCULAR; INTRAVENOUS at 07:09

## 2018-09-28 NOTE — H&P
[facility] Consult:  Consult from: Dr Cloud, [facility] medicine  Consult for: gross hematuria     Bruce Segovia is a 53 y.o. male who presents for evaluation of gross hematuria.     The patient reports starting to pass some blood in his urine with blood clots approximately 4 days ago, which was progressively worsening to the point where yesterday he was having difficulty passing his urine with some pain associated with urination and presented to [facility].  A non contrasted CT scan was done as he also has a history of kidney stones, and an 18 Kazakh 3 way León catheter was placed and continuous bladder irrigation was initiated.  He was transferred to [facility] for Urology Services and higher level of care.  In the ER he had a white blood cell count of 13.4, lactate of 2.2, hemoglobin and hematocrit of 13.9 and 40.4, a creatinine of 0.66.  CT scan reported 2-3 mm nonobstructing right mid pole calculus and a low attenuating left renal lesion 1.5 cm, potentially a caliceal diverticulum.  The bladder was noted to contain a 7 cm hyper attenuating mass consistent with blood clot, and prostate gland was noted to be enlarged measuring 6 cm transverse.  He was given 1 L of normal saline, and was noted to have greater than 500 cc residual in the bladder, and so the 3 way León catheter, 18 Kazakh, was placed with continuous bladder irrigation and he was transferred after being given a p.o. dose of nitrofurantoin.  Urinalysis had large blood, trace ketones, nitrite positive, leukocyte esterase negative, 0-2 white blood cells, significant red blood cells, moderate bacteria,     On review of medical record, he presented to the emergency department at 4:48 p.m. and after above evaluation, transfer was initiated at 7:19 p.m. on 08/23/2018.  I was initially notified of this patient at 6:30 a.m. on 08/24/2018, and by nursing report he was transferred to the emergency department with the 18 Kazakh 3 way León  catheter already in place.  No attempts at manual irrigation were done by the ER or by floor nursing overnight and reported that despite CBI urine was still red or punch colored.  Review of ER note notes patient transferred in on CBI with Roberta colored urine admission range with hospitalist.  Again Urology not notified throughout entire transfer or admission process, and consult called in the following morning.     Again, discussion with the patient, he reported approximately 4 days of clot passage in his urine progressively worsening until his presentation yesterday.  He did note for the few months prior to this episode he was having more difficulty passing his urine and a weaker urinary stream.  He has a non/never smoker  He did have gross hematuria and clot passage approximately 4 years ago which he attributed to kidney stone passage at that time.  He has never seen a urologist  No family history of bladder cancer kidney cancer or urothelial carcinoma.  His father had prostate cancer.  He otherwise denies past medical history  He did have mild occasional dysuria, though really only when passing the clots     Upon learning of the patient this morning, I did advise the nurse to pause the CBI and manually irrigate, it was reported to me that there were no clots but the urine remained red.          Past Medical History:   Diagnosis Date    Kidney stone       approx 3-4 yrs ago               Past Surgical History:   Procedure Laterality Date    APPENDECTOMY         approx. 20 yrs ago         No family history on file.     Social History               Socioeconomic History    Marital status:        Spouse name: Not on file    Number of children: Not on file    Years of education: Not on file    Highest education level: Not on file   Social Needs    Financial resource strain: Not on file    Food insecurity - worry: Not on file    Food insecurity - inability: Not on file    Transportation needs - medical:  Not on file    Transportation needs - non-medical: Not on file   Occupational History    Not on file   Tobacco Use    Smoking status: Not on file   Substance and Sexual Activity    Alcohol use: Not on file    Drug use: Not on file    Sexual activity: Not on file   Other Topics Concern    Not on file   Social History Narrative    Not on file            Review of patient's allergies indicates:  No Known Allergies     Medications Reviewed: see MAR     ROS:     Constitutional: denies fevers, chills, night sweats, fatigue, malaise  Respiratory: negative for cough, shortness of breath, wheezing, dyspnea.  Cardiovascular:  negative for chest pain, varicose veins, ankle swelling, palpitations, syncope.  GI: negative for abdominal pain, heartburn, indigestion, nausea, vomiting, constipation, diarrhea, blood in stool.   Urology: as noted above in HPI  Endocrinology: negative for cold intolerance, excessive thirst, not feeling tired/sluggish, no heat intolerance.   Hematology/Lymph: negative for easy bleeding, easy bruising, swollen glands.  Musculoskeletal: negative for back pain, joint pain, joint swelling, neck pain.  Allergy-Immunology: negative for seasonal allergies, negative for unusual infections.   Skin: negative for boils, breast lumps, hives, itching, rash.   Neurology: negative for, dizziness, headache, tingling/numbness, tremors.   Psych: satisfied with life; negative for, anxiety, depression, suicidal thoughts.      PHYSICAL EXAM:         Vitals:     08/24/18 0330   BP: (!) 180/98   Pulse: 75   Resp: 18   Temp: 98.7 °F (37.1 °C)      Body mass index is 29.6 kg/m².         General: Alert, cooperative, no distress, appears stated age  Head: Normocephalic, without obvious abnormality, atraumatic  Neck: no masses, no thyromegaly, no lymphadenopathy  Eyes: PERRL, conjunctiva/corneas clear  Lungs: Respirations unlabored, normal effort, no accessory muscle use  CV: Warm and well perfused extremities  Abdomen:  distended, bladder palpable, no CVA tenderness, no hepatosplenomegaly, no hernia  Penis: phallus normal, circumcised, well cared for, no plaques or lesions.   Scrotum: no cysts, no lesions, no rash, no hydrocele.   Epididymes: normal, nontender, symmetrical, no masses or cysts.   Testes: normal, both descended, no masses.   Urethra: 18 fr diaz in place draining opaque red urine, and blood clots at meatus and in towel under patients diaz  DANIEL: normal sphincter tone, no masses, no hemmorrhoids   PROSTATE: 30g, no nodules, non-tender, symmetrical.   Extremities: Extremities normal, atraumatic, no cyanosis or edema  Skin: Normal color, texture, and turgor, no rashes or lesions  Psych: Appropriate, well oriented, normal affect, normal mood  Neuro: Non-focal     Diaz Change/Manual Irrigation:  Using the patient's current indwelling 18 Tu[place] 3 way Diaz catheter, I used a 60 cc catheter tip syringe with sterile water to 1st instill 60 cc into the bladder, and with subsequent syringe full flush and irrigate.  Resistance was met almost immediately.  At this time, given his presentation and concern for clot retention despite Diaz catheter, I did remove his indwelling Diaz catheter and found to be completely clotted off.  Using aseptic technique and sterilely prepping meatus with Betadine, I did then passed a 22 [place] 3 way Diaz catheter into the bladder with immediate return of bloody urine, inflated the balloon with 10 cc of sterile water.  With the new catheter I was able to then manually irrigate as above with 60 cc catheter tip syringe and sterile water and medially aspirated syringe fulls of pure blood clots.  He did have a very significant clot burden and did take 2 L of sterile water manual irrigation, the majority of which returned pure clot to clear his bladder.  After 2 L of manual irrigation instill aspirating clots with the patient still uncomfortable and distended abdomen, there was difficulty aspirating  through the syringe.  I did notify the operating room to add the patient on for a clot evacuation, and then return to keep working.  His urine did clear, and ultimately with the urine draining clear I did then perform of bedside bladder scan and found his bladder to be empty was 0 cc around the León catheter.  I did cancel that on and left the León catheter draining clear with a plug in the 3rd port.        Assessment/Diagnosis:     Gross hematuria and clot retention     Plans:  After significant efforts at manual irrigation to clear the patient's bladder of his significant blood clot burden, I was able to review the CT scan images from [facility] which were uploaded into our system which did demonstrate significant clot burden filling his entire distended bladder. Corroborated other CT findings such as small nonobstructing stones.  As the stone was non contrasted, I did recommend a CT urogram to complete his gross hematuria workup imaging and clear his upper tracts.  Also noted that this would re-evaluate the bladder as well.     He has no significant risk factors for gross hematuria.  My suspicion at this time as he has a combination of a urinary tract infection and prostatic obstruction with delayed presentation of gross hematuria as a result of the above combination, after 4 days of clot passage.     I did discuss with both the patient and person common his wife by phone, to be observed throughout the morning for any recurrence of his gross hematuria now that his León catheter was draining clear.  If he did have recurrent significant gross hematuria or clot retention, we would then have to proceed urgently to the operating room for clot evacuation and evaluation of his bladder and management of any significant source of bleeding.  His urine remained relatively clear, would err on the side of outpatient workup with flexible cystourethroscopy, especially given concern for active urinary tract infection with  nitrite positive urine and leukocytosis.  We did discuss the importance of clearing infection before lower tract manipulation.     Given his nitrite positive urine with likely infection and significant efforts to irrigate his bladder, he did only receive 1 p.o. dose of nitrofurantoin in the ER and therefore I initiated 1 g of Rocephin IV and 80 mg of gentamicin IM.  As well, given his prostatomegaly and history of weakening urinary stream prior to clot passage, given dose of Flomax, and advise that he continue it daily.     Should his urine remain clear by lunchtime, can resume diet and if clinically stable would advocate for Diaz removal and voiding trial in the inpatient setting.  If he was unable to void he would then be discharged home with a Diaz catheter.  Assuming he will not need emergent surgery at this time after significant bedside intervention above, when stable for discharge, should be given 1 week empiric antibiotics pending results of urine culture (which will be collected prior to abx after has had time to drain urine only in absence of irrigation) as well as daily flomax, and will plan outpatient cystoscopy.     I was able to review his CT urogram noting simple bilateral renal cysts and clearance of clot burden from bladder. His urine remained clear with light pink tinge by noon and therefore his diaz was removed for voiding trial and he did void clear urine with one small residual clot. Felt clinically improved. Discussed with patient wife and Dr Cloud recommendation to proceed with outpatient cystoscopic workup after 1 week antibiotics, and will follow up culture to make sure culture specific treatment. I have scheduled the patient for cystoscopic evaluation [facility] on 9/4[facility]18 and should continue daily flomax, increase hydration, complete abx. All questions he and his wife had were answered and agreeable to treatment plan. Should he have recurrence of pascual gross hematuria or clot passage he  should return in the interim.     More than 2 hours spent in direct patient encounter, including prolonged efforts at manual bladder irrigation as described above, and extensive counseling regarding workup and plan. As well, over 1 hour spent on unit and outside of face to face encounter reviewing transfer record, getting supplies for diaz change/irrigation above, and other time spent on unit communicating with nursing staff and OR to coordinate care.

## 2018-09-28 NOTE — INTERVAL H&P NOTE
The patient has been examined and the H&P has been reviewed:    Since the above had cysto 9/11/18  Areas of abnormal hypervascular string-like projections into bladder lumen especially at/near dome, large erythematous hypervascular patches on lateral walls, significant enlarged obstructing prostate 4-5cm prostatic urethra with multiple abnormal red and early papillary projections from urothelium into prostatic urethra, and significant hypervascularity with varicosities over median lobe and abnormal projections from prostate at anterior bladder neck with bullous/erythematous overlying urothelium    Given plan for operative intervention for bladder biopsy/turbt and transurethral resection of prostate including abnormal lesions, advised checking psa and proceeding with trus/bx if abnormal before any resection.  PSA 7.1  Presents today for prostate biopsy  Patient is acceptable candidate for procedure at [facility]    Anesthesia/Surgery risks, benefits and alternative options discussed and understood by patient/family.          Active Hospital Problems    Diagnosis  POA    Elevated PSA [R97.20]  Yes      Resolved Hospital Problems   No resolved problems to display.

## 2018-09-28 NOTE — OP NOTE
[facility] Operative/Brief Discharge Note     Date: 9/28/18     Staff Surgeon: Rodo Lopez MD     Pre-Op Diagnosis:   Elevated psa       Post-Op Diagnosis: same     Procedure(s) Performed:   Transrectal ultrasound guided prostate needle biopsy     INDICATION FOR PROCEDURE:   Elevated psa  DANIEL 40g smooth nonnodular  GH workup revealed prostatic urethral abnormalities  PSA checked before planned TURP/TURBT     ANESTHESIA: Local periprostatic block; 10 cc 1% lidocaine     PSA: 7.1     TRUS VOLUME:94cm3  (W 60.3mm, H 46.5mm, L 64.1mm)     EBL: Minimal     SPECIMEN:   14 Prostate Biopsy Cores: right and left base, apex, and mid - medial and lateral of each  and bilateral transition zones     ULTRASOUND FINDINGS: no median lobe, normal small SVs, hypoechoities at apices bilaterally     CONFIRMED PATIENT TOOK ANTIBIOTICS: Yes     CONFIRMED PATIENT NOT TAKING ASPIRIN OR ANTICOAGULANTS: Yes     CONFIRMED PATIENT USED ENEMA: Yes     PROCEDURE IN DETAIL:  After informed consent, the patient was placed in the left lateral position and TRUS probe inserted into rectum. Ultrasound measurements taken as above and ultrasound of prostate performed with findings as above. Saggital image captured.     Approximately 10cc of 1% lidocaine injected bilaterally in serena-prostatic block fashion, as well as at the apex.   14 core biopies taken in a sextant fashion, as described in specimens as above, ultrasound guided.  Standard 12 core biopsy template, with the addition of transition zones     Pt tolerated procedure well without complication     CONDITION: Stable     DISHARGE:  Status post uncomplicated outpatient procedure as above.   Disposition: Home.  He will follow up in 2 weeks for biopsy results.    Resume regular diet  FU 2 weeks for biopsy results  Return to ER if temp >101, uncontrollable urethral or rectal bleeding, or inability to urinate/urinary retention  No sex/ejaculation x3 days, no riding mowers/tractors/bikes x2-4  weeks  Hold asa/bloodthinners x 3 days  Drink plenty of water may see blood in urine  Follow instructions of biopsy sheet

## 2018-10-01 VITALS
DIASTOLIC BLOOD PRESSURE: 90 MMHG | HEIGHT: 66 IN | WEIGHT: 165 LBS | RESPIRATION RATE: 18 BRPM | SYSTOLIC BLOOD PRESSURE: 139 MMHG | TEMPERATURE: 98 F | BODY MASS INDEX: 26.52 KG/M2 | HEART RATE: 97 BPM | OXYGEN SATURATION: 99 %

## 2018-10-01 RX ORDER — GENTAMICIN SULFATE 40 MG/ML
INJECTION, SOLUTION INTRAMUSCULAR; INTRAVENOUS
Status: DISPENSED
Start: 2018-10-01 | End: 2018-10-01

## 2018-10-10 ENCOUNTER — TELEPHONE (OUTPATIENT)
Dept: UROLOGY | Facility: CLINIC | Age: 53
End: 2018-10-10

## 2018-10-10 NOTE — TELEPHONE ENCOUNTER
Patient is scheduled for TURBT/TURP on 10/18/18.  He did call in today noting that he has an upcoming lapse in his insurance and wanted to put his procedure off until after the 1st of the year or potentially later.    We did review his admission for gross hematuria and clot retention and findings on cystoscopic workup with significant abnormalities and bladder mucosa and prostatic urethral mucosa.  I did discuss the importance of managing this area as well as the risks of delaying treatment.    We did have an extensive risk benefit discussion which included discussion of performing procedure well having insurance coverage as there will be a 90 day postop global.  Were even if he is not covered and register is for his appointments as self-pay, the appointments will be postop and he will have no financial responsibility at that time.  There is a small risk that if he needs any further treatment procedure medications he will be responsible for covering these, however the risk of recurrent gross hematuria clot retention hospitalization as was his presentation, especially while on ensured, would be more of a significant burden.  Not to mention, if any significant underlying pathology, condition may worsen with treatment delay.    Had extensive discussion with patient, answered all questions, and he will proceed with surgical management at this time on 10/18/18 as planned

## 2018-10-12 ENCOUNTER — APPOINTMENT (OUTPATIENT)
Dept: UROLOGY | Facility: CLINIC | Age: 53
End: 2018-10-12
Payer: COMMERCIAL

## 2018-10-12 DIAGNOSIS — R31.9 HEMATURIA, UNSPECIFIED TYPE: Primary | ICD-10-CM

## 2018-10-12 PROCEDURE — 87086 URINE CULTURE/COLONY COUNT: CPT

## 2018-10-14 LAB — BACTERIA UR CULT: NO GROWTH

## 2018-10-17 ENCOUNTER — ANESTHESIA EVENT (OUTPATIENT)
Dept: SURGERY | Facility: HOSPITAL | Age: 53
End: 2018-10-17
Payer: COMMERCIAL

## 2018-10-18 ENCOUNTER — HOSPITAL ENCOUNTER (OUTPATIENT)
Facility: HOSPITAL | Age: 53
Discharge: HOME | End: 2018-10-18
Attending: UROLOGY | Admitting: UROLOGY
Payer: COMMERCIAL

## 2018-10-18 ENCOUNTER — ANESTHESIA (OUTPATIENT)
Dept: SURGERY | Facility: HOSPITAL | Age: 53
End: 2018-10-18
Payer: COMMERCIAL

## 2018-10-18 VITALS
OXYGEN SATURATION: 99 % | DIASTOLIC BLOOD PRESSURE: 93 MMHG | TEMPERATURE: 98 F | BODY MASS INDEX: 28.93 KG/M2 | HEIGHT: 66 IN | RESPIRATION RATE: 16 BRPM | HEART RATE: 77 BPM | SYSTOLIC BLOOD PRESSURE: 154 MMHG | WEIGHT: 180 LBS

## 2018-10-18 DIAGNOSIS — R31.0 GROSS HEMATURIA: ICD-10-CM

## 2018-10-18 PROCEDURE — 36000707: Performed by: UROLOGY

## 2018-10-18 PROCEDURE — 63600175 PHARM REV CODE 636 W HCPCS: Performed by: UROLOGY

## 2018-10-18 PROCEDURE — 25000003 PHARM REV CODE 250: Performed by: ANESTHESIOLOGY

## 2018-10-18 PROCEDURE — 71000016 HC POSTOP RECOV ADDL HR: Performed by: UROLOGY

## 2018-10-18 PROCEDURE — 71000039 HC RECOVERY, EACH ADD'L HOUR: Performed by: UROLOGY

## 2018-10-18 PROCEDURE — 27200651 HC AIRWAY, LMA: Performed by: NURSE ANESTHETIST, CERTIFIED REGISTERED

## 2018-10-18 PROCEDURE — 88305 TISSUE EXAM BY PATHOLOGIST: CPT | Performed by: PATHOLOGY

## 2018-10-18 PROCEDURE — 71000015 HC POSTOP RECOV 1ST HR: Performed by: UROLOGY

## 2018-10-18 PROCEDURE — D9220A PRA ANESTHESIA: Mod: ANES,,, | Performed by: ANESTHESIOLOGY

## 2018-10-18 PROCEDURE — 63600175 PHARM REV CODE 636 W HCPCS: Performed by: NURSE ANESTHETIST, CERTIFIED REGISTERED

## 2018-10-18 PROCEDURE — 71000033 HC RECOVERY, INTIAL HOUR: Performed by: UROLOGY

## 2018-10-18 PROCEDURE — 99900103 DSU ONLY-NO CHARGE-INITIAL HR (STAT): Performed by: UROLOGY

## 2018-10-18 PROCEDURE — 99900104 DSU ONLY-NO CHARGE-EA ADD'L HR (STAT): Performed by: UROLOGY

## 2018-10-18 PROCEDURE — 37000009 HC ANESTHESIA EA ADD 15 MINS: Performed by: UROLOGY

## 2018-10-18 PROCEDURE — 88305 TISSUE EXAM BY PATHOLOGIST: CPT | Mod: 26,,, | Performed by: PATHOLOGY

## 2018-10-18 PROCEDURE — 27201423 OPTIME MED/SURG SUP & DEVICES STERILE SUPPLY: Performed by: UROLOGY

## 2018-10-18 PROCEDURE — 25000003 PHARM REV CODE 250: Performed by: NURSE ANESTHETIST, CERTIFIED REGISTERED

## 2018-10-18 PROCEDURE — 37000008 HC ANESTHESIA 1ST 15 MINUTES: Performed by: UROLOGY

## 2018-10-18 PROCEDURE — 25000003 PHARM REV CODE 250: Performed by: UROLOGY

## 2018-10-18 PROCEDURE — D9220A PRA ANESTHESIA: Mod: CRNA,,, | Performed by: NURSE ANESTHETIST, CERTIFIED REGISTERED

## 2018-10-18 PROCEDURE — 52601 PROSTATECTOMY (TURP): CPT | Mod: ,,, | Performed by: UROLOGY

## 2018-10-18 PROCEDURE — 36000706: Performed by: UROLOGY

## 2018-10-18 RX ORDER — GLYCOPYRROLATE 0.2 MG/ML
INJECTION INTRAMUSCULAR; INTRAVENOUS
Status: DISCONTINUED | OUTPATIENT
Start: 2018-10-18 | End: 2018-10-18

## 2018-10-18 RX ORDER — SODIUM CHLORIDE 0.9 % (FLUSH) 0.9 %
3 SYRINGE (ML) INJECTION
Status: DISCONTINUED | OUTPATIENT
Start: 2018-10-18 | End: 2018-10-18 | Stop reason: HOSPADM

## 2018-10-18 RX ORDER — OXYCODONE AND ACETAMINOPHEN 5; 325 MG/1; MG/1
1 TABLET ORAL EVERY 6 HOURS PRN
Qty: 15 TABLET | Refills: 0 | Status: SHIPPED | OUTPATIENT
Start: 2018-10-18 | End: 2018-11-19

## 2018-10-18 RX ORDER — SODIUM CHLORIDE, SODIUM LACTATE, POTASSIUM CHLORIDE, CALCIUM CHLORIDE 600; 310; 30; 20 MG/100ML; MG/100ML; MG/100ML; MG/100ML
75 INJECTION, SOLUTION INTRAVENOUS CONTINUOUS
Status: DISCONTINUED | OUTPATIENT
Start: 2018-10-18 | End: 2018-10-18 | Stop reason: HOSPADM

## 2018-10-18 RX ORDER — DEXAMETHASONE SODIUM PHOSPHATE 4 MG/ML
INJECTION, SOLUTION INTRA-ARTICULAR; INTRALESIONAL; INTRAMUSCULAR; INTRAVENOUS; SOFT TISSUE
Status: DISCONTINUED | OUTPATIENT
Start: 2018-10-18 | End: 2018-10-18

## 2018-10-18 RX ORDER — LIDOCAINE HYDROCHLORIDE 20 MG/ML
JELLY TOPICAL
Qty: 5 ML | Refills: 0 | Status: SHIPPED | OUTPATIENT
Start: 2018-10-18 | End: 2018-11-19

## 2018-10-18 RX ORDER — HYDROMORPHONE HYDROCHLORIDE 2 MG/ML
0.2 INJECTION, SOLUTION INTRAMUSCULAR; INTRAVENOUS; SUBCUTANEOUS EVERY 5 MIN PRN
Status: DISCONTINUED | OUTPATIENT
Start: 2018-10-18 | End: 2018-10-18 | Stop reason: HOSPADM

## 2018-10-18 RX ORDER — ATROPA BELLADONNA AND OPIUM 16.2; 3 MG/1; MG/1
30 SUPPOSITORY RECTAL ONCE
Status: COMPLETED | OUTPATIENT
Start: 2018-10-18 | End: 2018-10-18

## 2018-10-18 RX ORDER — OXYCODONE HYDROCHLORIDE 5 MG/1
5 TABLET ORAL
Status: DISCONTINUED | OUTPATIENT
Start: 2018-10-18 | End: 2018-10-18 | Stop reason: HOSPADM

## 2018-10-18 RX ORDER — DIPHENHYDRAMINE HYDROCHLORIDE 50 MG/ML
25 INJECTION INTRAMUSCULAR; INTRAVENOUS EVERY 6 HOURS PRN
Status: DISCONTINUED | OUTPATIENT
Start: 2018-10-18 | End: 2018-10-18 | Stop reason: HOSPADM

## 2018-10-18 RX ORDER — ONDANSETRON 2 MG/ML
4 INJECTION INTRAMUSCULAR; INTRAVENOUS ONCE
Status: DISCONTINUED | OUTPATIENT
Start: 2018-10-18 | End: 2018-10-18 | Stop reason: HOSPADM

## 2018-10-18 RX ORDER — FENTANYL CITRATE 50 UG/ML
INJECTION, SOLUTION INTRAMUSCULAR; INTRAVENOUS
Status: DISCONTINUED | OUTPATIENT
Start: 2018-10-18 | End: 2018-10-18

## 2018-10-18 RX ORDER — CIPROFLOXACIN 500 MG/1
500 TABLET ORAL 2 TIMES DAILY
Qty: 10 TABLET | Refills: 0 | Status: SHIPPED | OUTPATIENT
Start: 2018-10-18 | End: 2018-11-19

## 2018-10-18 RX ORDER — ONDANSETRON HYDROCHLORIDE 2 MG/ML
INJECTION, SOLUTION INTRAMUSCULAR; INTRAVENOUS
Status: DISCONTINUED | OUTPATIENT
Start: 2018-10-18 | End: 2018-10-18

## 2018-10-18 RX ORDER — PHENYLEPHRINE HYDROCHLORIDE 10 MG/ML
INJECTION INTRAVENOUS
Status: DISCONTINUED | OUTPATIENT
Start: 2018-10-18 | End: 2018-10-18

## 2018-10-18 RX ORDER — OXYBUTYNIN CHLORIDE 5 MG/1
5 TABLET ORAL 3 TIMES DAILY PRN
Qty: 20 TABLET | Refills: 0 | Status: SHIPPED | OUTPATIENT
Start: 2018-10-18 | End: 2018-11-19

## 2018-10-18 RX ORDER — KETOROLAC TROMETHAMINE 30 MG/ML
INJECTION, SOLUTION INTRAMUSCULAR; INTRAVENOUS
Status: DISCONTINUED | OUTPATIENT
Start: 2018-10-18 | End: 2018-10-18

## 2018-10-18 RX ORDER — SODIUM CHLORIDE, SODIUM LACTATE, POTASSIUM CHLORIDE, CALCIUM CHLORIDE 600; 310; 30; 20 MG/100ML; MG/100ML; MG/100ML; MG/100ML
INJECTION, SOLUTION INTRAVENOUS CONTINUOUS
Status: ACTIVE | OUTPATIENT
Start: 2018-10-18

## 2018-10-18 RX ORDER — MEPERIDINE HYDROCHLORIDE 50 MG/ML
12.5 INJECTION INTRAMUSCULAR; INTRAVENOUS; SUBCUTANEOUS ONCE AS NEEDED
Status: DISCONTINUED | OUTPATIENT
Start: 2018-10-18 | End: 2018-10-18 | Stop reason: HOSPADM

## 2018-10-18 RX ORDER — KETAMINE HYDROCHLORIDE 100 MG/ML
INJECTION, SOLUTION INTRAMUSCULAR; INTRAVENOUS
Status: DISCONTINUED | OUTPATIENT
Start: 2018-10-18 | End: 2018-10-18

## 2018-10-18 RX ORDER — MIDAZOLAM HYDROCHLORIDE 1 MG/ML
INJECTION INTRAMUSCULAR; INTRAVENOUS
Status: DISCONTINUED | OUTPATIENT
Start: 2018-10-18 | End: 2018-10-18

## 2018-10-18 RX ORDER — PROPOFOL 10 MG/ML
VIAL (ML) INTRAVENOUS
Status: DISCONTINUED | OUTPATIENT
Start: 2018-10-18 | End: 2018-10-18

## 2018-10-18 RX ORDER — LIDOCAINE HYDROCHLORIDE 10 MG/ML
5 INJECTION, SOLUTION EPIDURAL; INFILTRATION; INTRACAUDAL; PERINEURAL ONCE
Status: DISPENSED | OUTPATIENT
Start: 2018-10-18

## 2018-10-18 RX ORDER — CIPROFLOXACIN 2 MG/ML
400 INJECTION, SOLUTION INTRAVENOUS
Status: COMPLETED | OUTPATIENT
Start: 2018-10-18 | End: 2018-10-18

## 2018-10-18 RX ORDER — FENTANYL CITRATE 50 UG/ML
25 INJECTION, SOLUTION INTRAMUSCULAR; INTRAVENOUS EVERY 5 MIN PRN
Status: DISCONTINUED | OUTPATIENT
Start: 2018-10-18 | End: 2018-10-18 | Stop reason: HOSPADM

## 2018-10-18 RX ORDER — LIDOCAINE HCL/PF 100 MG/5ML
SYRINGE (ML) INTRAVENOUS
Status: DISCONTINUED | OUTPATIENT
Start: 2018-10-18 | End: 2018-10-18

## 2018-10-18 RX ADMIN — KETAMINE HYDROCHLORIDE 30 MG: 100 INJECTION, SOLUTION, CONCENTRATE INTRAMUSCULAR; INTRAVENOUS at 11:10

## 2018-10-18 RX ADMIN — MIDAZOLAM HYDROCHLORIDE 2 MG: 1 INJECTION, SOLUTION INTRAMUSCULAR; INTRAVENOUS at 09:10

## 2018-10-18 RX ADMIN — LIDOCAINE HYDROCHLORIDE 100 MG: 20 INJECTION, SOLUTION INTRAVENOUS at 09:10

## 2018-10-18 RX ADMIN — FENTANYL CITRATE 50 MCG: 50 INJECTION, SOLUTION INTRAMUSCULAR; INTRAVENOUS at 10:10

## 2018-10-18 RX ADMIN — PHENYLEPHRINE HYDROCHLORIDE 100 MCG: 10 INJECTION INTRAVENOUS at 10:10

## 2018-10-18 RX ADMIN — PROPOFOL 25 MG: 10 INJECTION, EMULSION INTRAVENOUS at 10:10

## 2018-10-18 RX ADMIN — GLYCOPYRROLATE 0.2 MG: 0.2 INJECTION, SOLUTION INTRAMUSCULAR; INTRAVENOUS at 09:10

## 2018-10-18 RX ADMIN — CIPROFLOXACIN 400 MG: 2 INJECTION INTRAVENOUS at 09:10

## 2018-10-18 RX ADMIN — OXYCODONE HYDROCHLORIDE 5 MG: 5 TABLET ORAL at 01:10

## 2018-10-18 RX ADMIN — DEXAMETHASONE SODIUM PHOSPHATE 4 MG: 4 INJECTION, SOLUTION INTRAMUSCULAR; INTRAVENOUS at 09:10

## 2018-10-18 RX ADMIN — PROPOFOL 200 MG: 10 INJECTION, EMULSION INTRAVENOUS at 09:10

## 2018-10-18 RX ADMIN — ATROPA BELLADONNA AND OPIUM 30 MG: 16.2; 3 SUPPOSITORY RECTAL at 02:10

## 2018-10-18 RX ADMIN — SODIUM CHLORIDE, SODIUM LACTATE, POTASSIUM CHLORIDE, AND CALCIUM CHLORIDE: .6; .31; .03; .02 INJECTION, SOLUTION INTRAVENOUS at 08:10

## 2018-10-18 RX ADMIN — PHENYLEPHRINE HYDROCHLORIDE 200 MCG: 10 INJECTION INTRAVENOUS at 11:10

## 2018-10-18 RX ADMIN — FENTANYL CITRATE 100 MCG: 50 INJECTION, SOLUTION INTRAMUSCULAR; INTRAVENOUS at 09:10

## 2018-10-18 RX ADMIN — KETOROLAC TROMETHAMINE 30 MG: 30 INJECTION, SOLUTION INTRAMUSCULAR; INTRAVENOUS at 11:10

## 2018-10-18 RX ADMIN — ONDANSETRON 4 MG: 2 INJECTION, SOLUTION INTRAMUSCULAR; INTRAVENOUS at 09:10

## 2018-10-18 RX ADMIN — SODIUM CHLORIDE, SODIUM LACTATE, POTASSIUM CHLORIDE, AND CALCIUM CHLORIDE: .6; .31; .03; .02 INJECTION, SOLUTION INTRAVENOUS at 10:10

## 2018-10-18 NOTE — H&P
[facility] HandP     Bruce Segovia is a 53 y.o. male who presents for evaluation of gross hematuria.     52 yo M with recent hospital presentation for gross hematuria and clot retention. After clot evacuation, CT urogram unremarkable. Ucxs negative. Did have GH for a few days after discharge but urine has been clear since. Prior to admission, he did note a few months of having more difficulty passing his urine and a weaker urinary stream. Has since been on flomax which he notes has improved his urinary stream    9/11 cysto  Areas of abnormal hypervascular string-like projections into bladder lumen especially at/near dome, large erythematous hypervascular patches on lateral walls, significant enlarged obstructing prostate 4-5cm prostatic urethra with multiple abnormal red and early papillary projections from urothelium into prostatic urethra, and significant hypervascularity with varicosities over median lobe and abnormal projections from prostate at anterior bladder neck with bullous/erythematous overlying urothelium    psa 7.1  Prostate biopsy 9/28/11  TRUS VOLUME:94cm3  (W 60.3mm, H 46.5mm, L 64.1mm)  14 cores benign               Past Medical History:   Diagnosis Date    Kidney stone       approx 3-4 yrs ago                   Past Surgical History:   Procedure Laterality Date    APPENDECTOMY         approx. 20 yrs ago         No family history on file.         Social History                   Socioeconomic History    Marital status:        Spouse name: Not on file    Number of children: Not on file    Years of education: Not on file    Highest education level: Not on file   Social Needs    Financial resource strain: Not on file    Food insecurity - worry: Not on file    Food insecurity - inability: Not on file    Transportation needs - medical: Not on file    Transportation needs - non-medical: Not on file   Occupational History    Not on file   Tobacco Use    Smoking status: Not on file    Substance and Sexual Activity    Alcohol use: Not on file    Drug use: Not on file    Sexual activity: Not on file   Other Topics Concern    Not on file   Social History Narrative    Not on file             Review of patient's allergies indicates:  No Known Allergies     Medications Reviewed: see MAR     ROS:     Constitutional: denies fevers, chills, night sweats, fatigue, malaise  Respiratory: negative for cough, shortness of breath, wheezing, dyspnea.  Cardiovascular:  negative for chest pain, varicose veins, ankle swelling, palpitations, syncope.  GI: negative for abdominal pain, heartburn, indigestion, nausea, vomiting, constipation, diarrhea, blood in stool.   Urology: as noted above in HPI  Endocrinology: negative for cold intolerance, excessive thirst, not feeling tired/sluggish, no heat intolerance.   Hematology/Lymph: negative for easy bleeding, easy bruising, swollen glands.  Musculoskeletal: negative for back pain, joint pain, joint swelling, neck pain.  Allergy-Immunology: negative for seasonal allergies, negative for unusual infections.   Skin: negative for boils, breast lumps, hives, itching, rash.   Neurology: negative for, dizziness, headache, tingling/numbness, tremors.   Psych: satisfied with life; negative for, anxiety, depression, suicidal thoughts.      PHYSICAL EXAM:           Vitals:     08/24/18 0330   BP: (!) 180/98   Pulse: 75   Resp: 18   Temp: 98.7 °F (37.1 °C)      Body mass index is 29.6 kg/m².         General: Alert, cooperative, no distress, appears stated age  Head: Normocephalic, without obvious abnormality, atraumatic  Neck: no masses, no thyromegaly, no lymphadenopathy  Eyes: PERRL, conjunctiva/corneas clear  Lungs: Respirations unlabored, normal effort, no accessory muscle use  CV: Warm and well perfused extremities  Abdomen: distended, bladder palpable, no CVA tenderness, no hepatosplenomegaly, no hernia  Penis: phallus normal, circumcised, well cared for, no plaques or  lesions.   Scrotum: no cysts, no lesions, no rash, no hydrocele.   Epididymes: normal, nontender, symmetrical, no masses or cysts.   Testes: normal, both descended, no masses.   DANIEL: normal sphincter tone, no masses, no hemmorrhoids   PROSTATE: 30g, no nodules, non-tender, symmetrical.   Extremities: Extremities normal, atraumatic, no cyanosis or edema  Skin: Normal color, texture, and turgor, no rashes or lesions  Psych: Appropriate, well oriented, normal affect, normal mood  Neuro: Non-focal          Assessment/Diagnosis:     Gross hematuria  bph with obstruction     Plans:    Given his bladder and prostatic findings, he will need further evaluation. We did discuss transurethral resection of abnormal prostatic urethral urothelial lesions both within lumen and at bladder neck for pathologic evaluation and can perform concurrent full resection/TURP to remove hypervascular tissue and relieve obstruction as also affects voiding. He will need formal bladder biopsy and fulguration of the multiple abnormal areas in bladder regardless. Could do this all concurrently. However before any resection of prostate, given significant visualized abnormality, advised baseline psa assessment as he has not had one, and if abnormal would precede the above with prostate biopsy formally, or possibly concurrent with bladder biopsy.   He has upcoming travel and asked to defer workup and OR management until he returns and therefore cysto/bladder biopsy/TURBT/TURP booked for 10/11/18 and will proceed at that time assuming normal psa. All questions answered and appropriate informed consent obtained. He has had recent cardiac evaluation by Dr Ayers since hospital discharge noted to be stable.     Prostate biopsy benign so will proceed with turp/turbt as planned

## 2018-10-18 NOTE — ANESTHESIA POSTPROCEDURE EVALUATION
"Anesthesia Post Evaluation    Patient: Bruce Segovia Jr.    Procedure(s) Performed: Procedure(s) (LRB):  TURP (TRANSURETHRAL RESECTION OF PROSTATE) (N/A)    Final Anesthesia Type: general  Patient location during evaluation: PACU  Patient participation: Yes- Able to Participate  Level of consciousness: awake and alert and oriented  Post-procedure vital signs: reviewed and stable  Pain management: adequate  Airway patency: patent  PONV status at discharge: No PONV  Anesthetic complications: no      Cardiovascular status: blood pressure returned to baseline  Respiratory status: unassisted, spontaneous ventilation and room air  Hydration status: euvolemic  Follow-up not needed.        Visit Vitals  BP (!) 160/87   Pulse 72   Temp 36.8 °C (98.3 °F) (Oral)   Resp 16   Ht 5' 6" (1.676 m)   Wt 81.6 kg (180 lb)   SpO2 100%   BMI 29.05 kg/m²       Pain/Wendy Score: Pain Assessment Performed: Yes (10/18/2018 11:46 AM)  Presence of Pain: complains of pain/discomfort (10/18/2018  1:27 PM)  Pain Rating Prior to Med Admin: 6 (10/18/2018  1:00 PM)  Pain Rating Post Med Admin: 4 (10/18/2018  1:15 PM)  Wendy Score: 10 (10/18/2018  1:27 PM)        "

## 2018-10-18 NOTE — DISCHARGE INSTRUCTIONS
"Discharge Instructions: After Your Surgery/Procedure  Youve just had surgery. During surgery you were given medicine called anesthesia to keep you relaxed and free of pain. After surgery you may have some pain or nausea. This is common. Here are some tips for feeling better and getting well after surgery.     Stay on schedule with your medication.   Going home  Your doctor or nurse will show you how to take care of yourself when you go home. He or she will also answer your questions. Have an adult family member or friend drive you home.      For your safety we recommend these precaution for the first 24 hours after your procedure:  · Do not drive or use heavy equipment.  · Do not make important decisions or sign legal papers.  · Do not drink alcohol.  · Have someone stay with you, if needed. He or she can watch for problems and help keep you safe.  · Your concentration, balance, coordination, and judgement may be impaired for many hours after anesthesia.  Use caution when ambulating or standing up.     · You may feel weak and "washed out" after anesthesia and surgery.      Subtle residual effects of general anesthesia or sedation with regional / local anesthesia can last more than 24 hours.  Rest for the remainder of the day or longer if your Doctor/Surgeon has advised you to do so.  Although you may feel normal within the first 24 hours, your reflexes and mental ability may be impaired without you realizing it.  You may feel dizzy, lightheaded or sleepy for 24 hours or longer.      Be sure to go to all follow-up visits with your doctor. And rest after your surgery for as long as your doctor tells you to.  Coping with pain  If you have pain after surgery, pain medicine will help you feel better. Take it as told, before pain becomes severe. Also, ask your doctor or pharmacist about other ways to control pain. This might be with heat, ice, or relaxation. And follow any other instructions your surgeon or nurse gives " you.  Tips for taking pain medicine  To get the best relief possible, remember these points:  · Pain medicines can upset your stomach. Taking them with a little food may help.  · Most pain relievers taken by mouth need at least 20 to 30 minutes to start to work.  · Taking medicine on a schedule can help you remember to take it. Try to time your medicine so that you can take it before starting an activity. This might be before you get dressed, go for a walk, or sit down for dinner.  · Constipation is a common side effect of pain medicines. Call your doctor before taking any medicines such as laxatives or stool softeners to help ease constipation. Also ask if you should skip any foods. Drinking lots of fluids and eating foods such as fruits and vegetables that are high in fiber can also help. Remember, do not take laxatives unless your surgeon has prescribed them.  · Drinking alcohol and taking pain medicine can cause dizziness and slow your breathing. It can even be deadly. Do not drink alcohol while taking pain medicine.  · Pain medicine can make you react more slowly to things. Do not drive or run machinery while taking pain medicine.  Your health care provider may tell you to take acetaminophen to help ease your pain. Ask him or her how much you are supposed to take each day. Acetaminophen or other pain relievers may interact with your prescription medicines or other over-the-counter (OTC) drugs. Some prescription medicines have acetaminophen and other ingredients. Using both prescription and OTC acetaminophen for pain can cause you to overdose. Read the labels on your OTC medicines with care. This will help you to clearly know the list of ingredients, how much to take, and any warnings. It may also help you not take too much acetaminophen. If you have questions or do not understand the information, ask your pharmacist or health care provider to explain it to you before you take the OTC medicine.  Managing  nausea  Some people have an upset stomach after surgery. This is often because of anesthesia, pain, or pain medicine, or the stress of surgery. These tips will help you handle nausea and eat healthy foods as you get better. If you were on a special food plan before surgery, ask your doctor if you should follow it while you get better. These tips may help:  · Do not push yourself to eat. Your body will tell you when to eat and how much.  · Start off with clear liquids and soup. They are easier to digest.  · Next try semi-solid foods, such as mashed potatoes, applesauce, and gelatin, as you feel ready.  · Slowly move to solid foods. Dont eat fatty, rich, or spicy foods at first.  · Do not force yourself to have 3 large meals a day. Instead eat smaller amounts more often.  · Take pain medicines with a small amount of solid food, such as crackers or toast, to avoid nausea.     Call your surgeon if  · You still have pain an hour after taking medicine. The medicine may not be strong enough.  · You feel too sleepy, dizzy, or groggy. The medicine may be too strong.  · You have side effects like nausea, vomiting, or skin changes, such as rash, itching, or hives.       If you have obstructive sleep apnea  You were given anesthesia medicine during surgery to keep you comfortable and free of pain. After surgery, you may have more apnea spells because of this medicine and other medicines you were given. The spells may last longer than usual.   At home:  · Keep using the continuous positive airway pressure (CPAP) device when you sleep. Unless your health care provider tells you not to, use it when you sleep, day or night. CPAP is a common device used to treat obstructive sleep apnea.  · Talk with your provider before taking any pain medicine, muscle relaxants, or sedatives. Your provider will tell you about the possible dangers of taking these medicines.  © 5073-0540 [employer]. [address] [place] [address]  [place] [address]. All rights reserved. This information is not intended as a substitute for professional medical care. Always follow your healthcare professional's instructions.    General Information:    1.  Do not drink alcoholic beverages including beer for 24 hours or as long as you are on pain medication..  2.  Do not drive a motor vehicle, operate machinery or power tools, or signs legal papers for 24 hours or as long as you are on pain medication.   3.  You may experience light-headedness, dizziness, and sleepiness following surgery. Please do not stay alone. A responsible adult should be with you for this 24 hour period.  4.  Go home and rest.    5. Progress slowly to a normal diet unless instructed.  Otherwise, begin with liquids such as soft drinks, then soup and crackers working up to solid foods. Drink plenty of nonalcoholic fluids.  6.  Certain anesthetics and pain medications produce nausea and vomiting in certain       individuals. If nausea becomes a problem at home, call you doctor.    7. A nurse will be calling you sometime after surgery. Do not be alarmed. This is our way of finding out how you are doing.    8. Several times every hour while you are awake, take 2-3 deep breaths and cough. If you had stomach surgery hold a pillow or rolled towel firmly against your stomach before you cough. This will help with any pain the cough might cause.  9. Several times every hour while you are awake, pump and flex your feet 5-6 times and do foot circles. This will help prevent blood clots.    10.Call your doctor for severe pain, bleeding, fever, or signs or symptoms of infection (pain, swelling, redness, foul odor, drainage).          Transurethral Resection of the Prostate (TURP): Home Recovery  Take it easy for the first month or so while you heal after transurethral resection of the prostate. During the first few weeks, you may feel burning when you pass urine. You may also feel like you have to urinate often.  These sensations will go away. If your urine becomes bright red, it means that the treated area is bleeding. This may happen on and off for a month or so after a TURP. If this occurs, rest and drink plenty of fluids until the bleeding stops.    While you are healing  To help prevent problems during the first month after your surgery, follow these tips:  · Drink plenty of fluids.  · Avoid strenuous exercise.  · Dont lift anything over 10 pounds.  · Avoid sexual activity and strenuous exercise.  · Avoid straining at stool. If you are constipated, take stool softeners or laxatives for a few days.  · Talk to your doctor about when you can return to work.  · Ask your doctor when you can start driving again.  · Dont sit for more than 60 minutes without getting up.  · Check with your doctor before taking over-the-counter pain relievers. These include aspirin, ibuprofen, and naproxen.      Follow-up care  You will visit your doctor to make sure you are healing without problems. If tests were done on your prostate tissue your doctor will discuss the results with you.     When to call your healthcare provider  Call your healthcare provider right away if any of these occur:  · Youre not able to urinate, or notice a decrease in urine flow  · You have a fever of 100.4°F (38°C) or higher, or as directed by your doctor  · You have severe pain that is not relieved by prescription pain medicine  · You have bleeding that doesnt stop within 12 hours  · You have bleeding with clots, or blood plugs up the catheter. (A little blood in the urine is normal.)  · The catheter falls out   Getting back to sex  BPH and its treatments rarely cause problems with sex. Even if you have retrograde ejaculation, your erection or orgasm shouldnt feel any different than it used to. Retrograde ejaculation can result in infertility, as the semen will not come out of the penis. If you notice any problems with sex, talk to your doctor. Help may be  available.  Trouble controlling your urine  Some men will have trouble controlling their urine (urinary incontinence) after this surgery. This may last for a few days, weeks, or months, but it will improve with time. You may also pass your urine more often (urinary frequency), like you did before the surgery. This will also improve as you start to heal.  Date Last Reviewed: 1/1/2017  © 8889-8101 [employer]. [address] [place] [address], [place] [address]. All rights reserved. This information is not intended as a substitute for professional medical care. Always follow your healthcare professional's instructions.            Post op instructions for prevention of DVT  What is deep vein thrombosis?  Deep vein thrombosis (DVT) is the medical term for blood clots in the deep veins of the leg.  These blood clots can be dangerous.  A DVT can block a blood vessel and keep blood from getting where it needs to go.  Another problem is that the clot can travel to other parts of the body such as the lungs.  A clot that travels to the lungs is called a pulmonary embolus (PE) and can cause serious problems with breathing which can lead to death.  Am I at risk for DVT/PE?  If you are not very active, you are at risk of DVT.  Anyone confined to bed, sitting for long periods of time, recovering from surgery, etc. increases the risk of DVT.  Other risk factors are cancer diagnosis, certain medications, estrogen replacement in any form,older age, obesity, pregnancy, smoking, history of clotting disorders, and dehydration.  How will I know if I have a DVT?   Swelling in the lower leg   Pain   Warmth, redness, hardness or bulging of the vein  If you have any of these symptoms, call your doctors office right away.  Some people will not have any symptoms until the clot moves to the lungs.  What are the symptoms of a PE?   Panting, shortness of breath, or trouble breathing   Sharp, knife-like chest pain when you  breathe   Coughing or coughing up blood   Rapid heartbeat  If you have any of these symptoms or get worse quickly, call 911 for emergency treatment.  How can I prevent a DVT?   Avoid long periods of inactivity and dont cross your legs--get up and walk around every hour or so.   Stay active--walking after surgery is highly encouraged.  This means you should get out of the house and walk in the neighborhood.  Going up and down stairs will not impair healing (unless advised against such activity by your doctor).     Drink plenty of noncaffeinated, nonalcoholic fluids each day to prevent dehydration.   Wear special support stockings, if they have been advised by your doctor.   If you travel, stop at least once an hour and walk around.   Avoid smoking (assistance with stopping is available through your healthcare provider)  Always notify your doctor if you are not able to follow the post operative instructions that are given to you at the time of discharge.  It may be necessary to prescribe one of the medications available to prevent DVT.    Using Opioids for Pain Management     Your doctor has given instructions for you to take an opioid.  This is a drug for bad pain.  It helps control pain without causing bleeding and kidney problems.  Common opioid names are morphine, hydromorphone, oxycodone, and methadone. These drugs are called narcotics.    There are several safety concerns you need to know.     · It is against the law to give or sell this drug to another person.  You must keep this medicine safely locked.    · You may have side effects from taking this medication.  These include nausea, itching, sweating, sleepiness, a change in your ability to breathe, and depression.  · Do not take alcohol or sleeping pills opioids.    · Long-term opoid use may no longer giver you relief from pain.  It can cause you stomach pain, mental anxiety, and headaches.  Long-term opoid use can potentially lead to unlawful  street drug abuse and reduce your ability to stay employed.    · Your body may become opioid tolerant if you need to take more to get relief.    · You must stop taking opioids if you begin having more pain as a result of the medicine.    · Opioid withdrawal occurs when you have to stop taking the drug.  It can cause you to have nausea, vomiting, diarrhea, stomach pain, anxiety, and dilated pupils in your eyes. This condition means you are opioid dependent.    · Addiction is a drug induced brain disease. It means there are changes in how your brain is working.  Children, teens, and young adults under 25 years old are more likely to get addicted to opioids.      · Addiction can happen with repeated opioid use.  It does not happen with short-term use of two weeks or less.       For more information, please speak with your doctor or pharmacist.       Discharge home today status post uncomplicated procedure as above  Diet - resume home diet  Follow up: Monday 10/22 by 0900 for diaz removal visit, then will arrange 4 weeks later with MD  Instructions:   Avoid strenuous activity while diaz in place  No riding mowers, bicycles, motorcycles, tractors for 2-3 weeks  Continue flomax x1 week after diaz is removed then can stop  No fish oil/aspirin x3-5 days  Pink/clear red (koolaid) urine ok as long as clear/translucent without dark blood or clots, and draining without difficulty - drink lots of water         We hope your stay was comfortable as you heal now, mend and rest.    For we have enjoyed taking care of you by giving your our best.    And as you get better, by regaining your health and strength;   We count it as a privilege to have served you and hope your time at [facility] was well spent.      Thank  You!!!

## 2018-10-18 NOTE — PLAN OF CARE
Arrived ambulatory in no distress, plan of care reviewed and warm blankets provided, wife in the waiting room

## 2018-10-18 NOTE — ANESTHESIA PREPROCEDURE EVALUATION
10/18/2018  Bruce Segovia Jr. is a 53 y.o., male.    Anesthesia Evaluation    I have reviewed the Patient Summary Reports.    I have reviewed the Nursing Notes.   I have reviewed the Medications.     Review of Systems  Anesthesia Hx:  No problems with previous Anesthesia Denies Hx of Anesthetic complications    Social:  Non-Smoker Smoking Status: Never Smoker  Smokeless Tobacco Status: Never Used  Alcohol use: No  Drug use: No       Cardiovascular:   Hypertension Denies MI.  Denies CAD.    Denies CABG/stent.   Denies Angina.    Pulmonary:   Denies COPD.  Denies Asthma.  Denies Recent URI.    Renal/:   Denies Chronic Renal Disease.     Hepatic/GI:   Denies GERD. Denies Liver Disease.    Neurological:   Denies TIA. Denies CVA. Denies Seizures.    Endocrine:   Denies Diabetes. Denies Hypothyroidism.    Psych:   Denies Psychiatric History.          Physical Exam  General:  Well nourished    Airway/Jaw/Neck:  Airway Findings: Mouth Opening: Normal Tongue: Normal  General Airway Assessment: Adult, Good  Mallampati: II  Improves to II with phonation.  TM Distance: 4-6 cm      Dental:  Dental Findings: In tact   Chest/Lungs:  Chest/Lungs Findings: Clear to auscultation, Normal Respiratory Rate     Heart/Vascular:  Heart Findings: Rate: Normal  Rhythm: Regular Rhythm  Sounds: Normal  Heart murmur: negative       Mental Status:  Mental Status Findings:  Cooperative, Alert and Oriented         Anesthesia Plan  Type of Anesthesia, risks & benefits discussed:  Anesthesia Type:  general  Patient's Preference:   Intra-op Monitoring Plan: standard ASA monitors  Intra-op Monitoring Plan Comments:   Post Op Pain Control Plan:   Post Op Pain Control Plan Comments:   Induction:    Beta Blocker:  Patient is not currently on a Beta-Blocker (No further documentation required).       Informed Consent: Patient understands  risks and agrees with Anesthesia plan.  Questions answered. Anesthesia consent signed with patient.  ASA Score: 2     Day of Surgery Review of History & Physical: I have interviewed and examined the patient. I have reviewed the patient's H&P dated:  There are no significant changes.  H&P update referred to the surgeon.         Ready For Surgery From Anesthesia Perspective.

## 2018-10-18 NOTE — PLAN OF CARE
Discharged home with spouse after full teachings on diaz care and signs and symptoms to report, patient seen by Dr. Lopez twice prior to discharge, all valuables were returned, and both stated full understanding of all instructions per Dr. Lopez and myself. Spouse allowed to empty the leg bag during teachings, and both stated ready for discharge. Patient appeared comfortable without any complaints or request. IV removed

## 2018-10-18 NOTE — BRIEF OP NOTE
[facility]  Brief Operative/Discharge Note    Date: 10/18/2018    Staff Surgeon: Rodo Lopez MD    Pre-Op Diagnosis:   BPH with obstruction  Gross hematuria    Post-Op Diagnosis: same    Procedure(s) Performed:   TURP    Specimen(s): prostatic urethra, prostate chips    Anesthesia: General LMA anesthesia    Findings: large obstructing prostate, irregular hypervascular prostatic urethra    Estimated Blood Loss: 75cc    Drains: 22 french diaz    Complications: none    Disposition: pacu    Discharge home today status post uncomplicated procedure as above  Diet - resume home diet  Follow up: Monday 10/22 by 0900 for diaz removal visit, then will arrange 4 weeks later with MD  Instructions:   Avoid strenuous activity while diaz in place  No riding mowers, bicycles, motorcycles, tractors for 2-3 weeks  Continue flomax x1 week after diaz is removed then can stop  No fish oil/aspirin x3-5 days  Pink/clear red (koolaid) urine ok as long as clear/translucent without dark blood or clots, and draining without difficulty - drink lots of water      Meds:     Medication List      START taking these medications    oxyCODONE-acetaminophen 5-325 mg per tablet  Commonly known as:  PERCOCET  Take 1 tablet by mouth every 6 (six) hours as needed (pain not relieved by otc agents).        CHANGE how you take these medications    hydrALAZINE 25 MG tablet  Commonly known as:  APRESOLINE  Take 1 tablet (25 mg total) by mouth every 8 (eight) hours.  What changed:    · when to take this  · reasons to take this        CONTINUE taking these medications    ciprofloxacin HCl 500 MG tablet  Commonly known as:  CIPRO  Take 1 tablet (500 mg total) by mouth 2 (two) times daily.     lisinopril 20 MG tablet  Commonly known as:  PRINIVIL,ZESTRIL  Take 1 tablet (20 mg total) by mouth once daily.     tamsulosin 0.4 mg Cap  Commonly known as:  FLOMAX  Take 1 capsule (0.4 mg total) by mouth once daily.           Where to Get Your Medications       These medications were sent to [id number] - [place], [place] - [address] AT [facility] [address]  [address], [place] [address]    Phone:  [phone]   · ciprofloxacin HCl 500 MG tablet  · oxyCODONE-acetaminophen 5-325 mg per tablet

## 2018-10-18 NOTE — TRANSFER OF CARE
"Anesthesia Transfer of Care Note    Patient: Bruce Segovia Jr.    Procedure(s) Performed: Procedure(s) (LRB):  TURP (TRANSURETHRAL RESECTION OF PROSTATE) (N/A)    Patient location: PACU    Anesthesia Type: general    Transport from OR: Transported from OR on 2-3 L/min O2 by NC with adequate spontaneous ventilation    Post pain: adequate analgesia    Post assessment: no apparent anesthetic complications and tolerated procedure well    Post vital signs: stable    Level of consciousness: responds to stimulation and sedated    Nausea/Vomiting: no nausea/vomiting    Complications: none    Transfer of care protocol was followed      Last vitals:   Visit Vitals  BP (!) 102/58 (BP Location: Right arm, Patient Position: Lying)   Pulse 82   Temp 36.7 °C (98.1 °F) (Temporal)   Resp 18   Ht 5' 6" (1.676 m)   Wt 81.6 kg (180 lb)   SpO2 98%   BMI 29.05 kg/m²     "

## 2018-10-18 NOTE — PLAN OF CARE
Catheter untaped, stat lock securing diaz catheter, 20ml removed from balloon, leg bag attached to catheter, urine noted in leg bag to be blood tinged (fruit punch color)

## 2018-10-22 ENCOUNTER — CLINICAL SUPPORT (OUTPATIENT)
Dept: UROLOGY | Facility: CLINIC | Age: 53
End: 2018-10-22
Payer: COMMERCIAL

## 2018-10-22 DIAGNOSIS — R31.0 GROSS HEMATURIA: Primary | ICD-10-CM

## 2018-10-22 DIAGNOSIS — R97.20 ELEVATED PSA: ICD-10-CM

## 2018-10-22 NOTE — PROGRESS NOTES
Patient here today to have his catheter removed post TURP.      30ml saline removed from catheter balloon.   Catheter removed with no difficulty.   Leg bag contains 100ml of red/clear urine.   Patient instructed to drink 6-8 eight oz glasses on non-cafeinated beverages.  If unable to urinate by 2:30 this afternoon, come back to the clinic to have catheter replaced.

## 2018-11-18 NOTE — PROGRESS NOTES
[facility] Progress Note    Bruce Segovia Jr. is a 53 y.o. male who presents for postop follow up s/p TURP on 10/18/18    Mr. Segovia is a 53-year-old man who was hospitalized in September for gross hematuria with clot retention which cleared with CBI and manual irrigation, and urinary symptoms improved with Flomax.  On cystoscopic evaluation on 9/11/18 he was found to have significant erythematous and hypervascular lesions in the bladder as well as significantly enlarged obstructing prostate with multiple abnormal red in early papillary projections from the urothelium into the prostatic urethral lumen with significant hypervascularity and varicosities over the median lobe and abnormal projections from the prostate at the bladder neck.  Given the significant visual abnormalities in the prostatic urethra, PSA checked and found to be 7.1 and he underwent transrectal ultrasound-guided prostate biopsy on 9/28/11 finding and 94 g prostate and a 14 core prostate biopsy was benign    At time of TURP 10/18/18 found only to have large obstructing prostate, irregular hypervascular prostatic urethra with abnormal lumenal projections, and no bladder urothelial abnormalities.  TURP performed with separate specimen sent of initial prostatic urethral resection given abnormal mucosa. He returned for diaz removal on 10/22/18.  PATH: 28.6g resected (though more vaporized) BPH with no histologic abnormality of prostatic urethral segment    He returns today noting.  He is doing quite well, voiding well, no hematuruia.  Did pass some scabs and tissue fragments initially with small blood in that void, but no other blood in his urine.  AUA SS 6/2 mostly satisfied: 3 urgency; 2: frequency; 1 nocturia  Urge is mostly the discomfort with urge to void, but can hold it for extended periods of time if needed  Mild pain with the 1st verge and when finishing his stream.  This has been decreasing over time since the surgery.  He is off  "of his Flomax  Urine has 2+ leukocytes, trace protein, 250 of blood.      ROS: A comprehensive 10 system review was performed and is negative except as noted above in HPI    PHYSICAL EXAM:    Vitals:    11/19/18 1024   BP: 126/83   Pulse: 96     Body mass index is 29.05 kg/m². Weight: 81.6 kg (180 lb) Height: 5' 6" (167.6 cm)       General: Alert, cooperative, no distress, appears stated age   Head: Normocephalic, without obvious abnormality, atraumatic   Eyes: PERRL, conjunctiva/corneas clear   Lungs: Respirations unlabored   Heart: Warm and well perfused   Abdomen: soft nt nd  Extremities: Extremities normal, atraumatic, no cyanosis or edema   Skin: Skin color, texture, turgor normal, no rashes or lesions   Psych: Appropriate   Neurologic: Non-focal       Recent Results (from the past 336 hour(s))   POCT URINE DIPSTICK WITHOUT MICROSCOPE    Collection Time: 11/19/18 11:22 AM   Result Value Ref Range    Color, UA cloudy yellow     Spec Grav UA 1.010     pH, UA 6     WBC, UA 2+     Nitrite, UA n     Protein n     Glucose, UA n     Ketones, UA n     Urobilinogen, UA n     Bilirubin n     Blood,         ASSESSMENT   1. BPH without obstruction/lower urinary tract symptoms     2. Dysuria  Urine culture    POCT URINE DIPSTICK WITHOUT MICROSCOPE   3. History of gross hematuria         Plan    Doing well after TURP now with resolution of his voiding symptoms.  Gross hematuria, which was previously found to be due to hypervascular enlarged obstructing prostate, has also resolved  He is having some urgency of urination which we did discuss can be transient after relief of longstanding obstruction.  His urgency is rather a uncomfortable sensation with the urge to void.  His discomfort associated with voiding is waning since surgery which we did discuss is within the realm of normal and should continue to improve  Discussed conservative measures to control urgency and frequency including avoiding bladder irritants, timed " voiding, not postponing voiding, and bowel regimen (as distended bowel has extrinsic compressive effect on bladder. Discussed bladder irritants include coffe (even decaf), tea, alcohol, soda, spicy foods, acidic juices (orange, tomato), vinegar, and artificial sweeteners.  Will chart check urine culture today to rule out any infection  RTC 6 months

## 2018-11-19 ENCOUNTER — OFFICE VISIT (OUTPATIENT)
Dept: UROLOGY | Facility: CLINIC | Age: 53
End: 2018-11-19
Payer: COMMERCIAL

## 2018-11-19 VITALS
SYSTOLIC BLOOD PRESSURE: 126 MMHG | WEIGHT: 180 LBS | DIASTOLIC BLOOD PRESSURE: 83 MMHG | HEIGHT: 66 IN | BODY MASS INDEX: 28.93 KG/M2 | HEART RATE: 96 BPM

## 2018-11-19 DIAGNOSIS — N40.0 BPH WITHOUT OBSTRUCTION/LOWER URINARY TRACT SYMPTOMS: Primary | ICD-10-CM

## 2018-11-19 DIAGNOSIS — Z87.898 HISTORY OF GROSS HEMATURIA: ICD-10-CM

## 2018-11-19 DIAGNOSIS — R30.0 DYSURIA: ICD-10-CM

## 2018-11-19 LAB
BILIRUB SERPL-MCNC: NORMAL MG/DL
BLOOD URINE, POC: 250
COLOR, POC UA: NORMAL
GLUCOSE UR QL STRIP: NORMAL
KETONES UR QL STRIP: NORMAL
LEUKOCYTE ESTERASE URINE, POC: NORMAL
NITRITE, POC UA: NORMAL
PH, POC UA: 6
PROTEIN, POC: NORMAL
SPECIFIC GRAVITY, POC UA: 1.01
UROBILINOGEN, POC UA: NORMAL

## 2018-11-19 PROCEDURE — 87086 URINE CULTURE/COLONY COUNT: CPT

## 2018-11-19 PROCEDURE — 99024 POSTOP FOLLOW-UP VISIT: CPT | Mod: S$GLB,,, | Performed by: UROLOGY

## 2018-11-19 PROCEDURE — 81002 URINALYSIS NONAUTO W/O SCOPE: CPT | Mod: S$GLB,,, | Performed by: UROLOGY

## 2018-11-19 PROCEDURE — 99999 PR PBB SHADOW E&M-EST. PATIENT-LVL III: CPT | Mod: PBBFAC,,, | Performed by: UROLOGY

## 2018-11-19 NOTE — LETTER
November 20, 2018        Sam Mckinnon MD  [address]  [place] [address]             [place] [facility]  [address] [place] [address] [address]  [facility] [address]  Phone: [phone]  Fax: [phone]   Patient: Bruce Segovia Jr.   MR Number: [id number]   YOB: 1965   Date of Visit: 11/19/2018       Dear Dr. Mckinnon:    I had the pleasure of seeing your patient Bruce Segovia today in postop follow up. Attached you will find relevant portions of my assessment and plan of care.    If you have questions, please do not hesitate to call me. I look forward to following Bruce Segovia along with you.    As always please do not hesitate to contact me for the urologic needs of your patients. Feel free to fax referrals directly to fax number listed above for expedited scheduling    Sincerely,        Rodo Lopez MD            CC  No Recipients    Enclosure

## 2018-11-21 LAB — BACTERIA UR CULT: NO GROWTH

## 2024-09-07 NOTE — OP NOTE
[facility] Operative Report     Date: 10/18/2018     Staff Surgeon: Rodo Lopez MD     Pre-Op Diagnosis:   BPH with obstruction  Gross hematuria     Post-Op Diagnosis: same     Procedure(s) Performed:    Transurethral resection of prostate, bipolar     Specimen(s):   1. prostatic urethra  2. prostate chips     Anesthesia: General LMA anesthesia     Findings: large obstructing prostate, irregular hypervascular prostatic urethra with abnormal lumenal projections, no bladder urothelial abnormalities.     Estimated Blood Loss: 75cc     Drains: 22 [place] diaz     Complications: none    Indications for procedure:  Mr. Segovia is a 53-year-old man who was hospitalized in September for gross hematuria with clot retention which cleared with CBI and manual irrigation, and urinary symptoms improved with Flomax.  On cystoscopic evaluation on 9/11/18 he was found to have significant erythematous and hypervascular lesions in the bladder as well as significantly enlarged obstructing prostate with multiple abnormal red in early papillary projections from the urothelium into the prostatic urethral lumen with significant hypervascularity and varicosities over the median lobe and abnormal projections from the prostate at the bladder neck.  Given the significant visual abnormalities in the prostatic urethra, PSA checked and found to be 7.1 and he underwent transrectal ultrasound-guided prostate biopsy on 9/28/11 finding and 94 g prostate and a 14 core prostate biopsy was benign.  He presents today for transurethral resection of the prostate with possible transurethral resection versus bladder biopsy of bladder wall abnormalities.  All risks and benefits of the aforementioned procedure were discussed in detail with the patient, all questions answered, and appropriate informed consent was obtained.     Procedure in detail:  After appropriate informed consent was obtained, the patient was taken back to the cystoscopy suite and  Most recent echo with EF 30%   patient has cardiology follow-up from previous hospitalization discuss AICD   Continue Lasix and Aldactone  Cardiology following  Patient was supposed to have LifeVest on but noncompliant.  Counseled  On outpatient GDMT with ARB, BB, Aldactone and Lasix   Asymptomatic   "placed in the lithotomy position. A WHO-approved time out was performed and preoperative antibiotics were given. He was then prepped and draped in the usual sterile fashion.     As I had recently performed cystoscopy,  the 24french resectoscope was then assembled and placed into the patient's bladder using a visual obturator. On passage of the scope, the patient's bladder was again drained and the visual obturator was exchanged for the bipolar resectoscope loop.  Cystoscopic findings in the prostate were consistent with office-based cystoscopy with trilobar hypertrophy with significant near-kissing lateral lobe obstruction, significant intraluminal hypervascularity and abnormal projections from urothelium with erythema and edema over median lobe projection extending to trigone. The bladder was mildly trabeculated but systematic inspection found bilateral ureteral orifices in orthotopic position on trigone not obstructed by median lobe, and no mucosal lesions or abnormalities seen on bladder mucosa as previously seen on cystoscopy.      Using normal saline irrigation fluid, I first identified his verumontanum to ensure my resection was proximal to it, as well as identifying trigone with bilateral ureteral orifices a safe distance away from bladder neck and median lobe. I first resected superficial central channel from 5 to 7 o'clock back to the verumontanum including some lateral protrusions of obstructing tissue where abnormal urothelial projections come from, to send separate specimen as "prostatic urethra" given the abnormal hypervascular appearance. This central channel resection was then completed during which I also took down his median lobe.       After this, I systematically resected his lateral lobes. He did have mild anterior obstructing tissue which was carefully resected and loop was used to cauterize all resection sites and spot coagulate any bleeding vessels/sinuses. Ureteral orifices on trigone were " visualized intermittently throughout to ensure they were safe from resection. Button electrode was also used for vaporization of tissue of base of prostate to smooth it out and used to take down remainder of lateral lobe obstruction bilaterally after rebound seen for adequate and hemostatic lateral lobe takedown, as well as for additional hemostasis.     Ellik was then used to completely evacuate the prostate chips from the bladder.  All evacuated prostate chips were then collected and were sent off for pathologic examination.  Next, I reinserted the resectoscope into the patient's bladder and filled the bladder.  The flow of irrigation was stopped and we again noted efflux from the ureteral orifices.  The area of resection was then examined and the peripheral margins were fulgurated.  With water and suction off, the bed of resection appeared hemostatic, so with all chips removed from bladder as confirmed on cystoscopy, the resectoscope was removed from the bladder     A 22-Persian Diaz catheter was then placed with 50cc in 30cc balllon, and 60cc cath tip syringe used to irrigate the bladder all of which was clear, and punctate tissue fragments aspirated.  Urine in the diaz bag was clear. His diaz was taped to traction on his thigh with mastasol and silk tape.     The patient tolerated the procedure well with no complications and was awakened and transferred to the recovery room in stable condition.         Disposition: Home today status post uncomplicated procedure as above  The diaz will be untaped from traction in 90 mins at which time 20cc will be removed from the balloon leaving 30cc remaining, and diaz will be placed to leg bag.  Patient will be discharged home with diaz in place once criteria are met, and will return for fill and pull voiding trial next week on Monday 10/22/18 by 0900am, and follow up with me 3-4 weeks later.

## (undated) DEVICE — SEE MEDLINE ITEM 157116

## (undated) DEVICE — SLEEVE SCD EXPRESS CALF MEDIUM

## (undated) DEVICE — TRAY DRY SPONGE SCRUB W FOAM

## (undated) DEVICE — ELECTRODE RESECTION LOOP LRGE

## (undated) DEVICE — SET CYSTO IRRIGATION UNIV SPIK

## (undated) DEVICE — DRAPE MINOR PROCEDURE

## (undated) DEVICE — SEE MEDLINE ITEM 157117

## (undated) DEVICE — Device

## (undated) DEVICE — SEE MEDLINE ITEM 152487

## (undated) DEVICE — SYR ONLY LUER LOCK 20CC

## (undated) DEVICE — GAUZE SPONGE BULKEE 6X6.75IN

## (undated) DEVICE — LUBRICANT SURGILUBE 2 OZ

## (undated) DEVICE — GLOVE SURG ULTRA TOUCH 7

## (undated) DEVICE — SET IRR URLGY 2LINE UNIV SPIKE

## (undated) DEVICE — ADHESIVE MASTISOL VIAL 48/BX

## (undated) DEVICE — SEE L#120831

## (undated) DEVICE — SOL IRR WATER STRL 3000 ML

## (undated) DEVICE — SOL WATER STRL IRR 1000ML

## (undated) DEVICE — JELLY LUBRICATING STERILE 5 GR

## (undated) DEVICE — SOL PVP-I SCRUB 7.5% 4OZ

## (undated) DEVICE — GLOVE PROTEXIS PI SYN SURG 7

## (undated) DEVICE — SEE L#95700

## (undated) DEVICE — SOL IRR NACL .9% 3000ML

## (undated) DEVICE — TAPE SILK 3IN

## (undated) DEVICE — LOOP CUTTING RESECT 12 D 24F

## (undated) DEVICE — CONTAINER SPECIMEN STRL 4OZ

## (undated) DEVICE — SEE MEDLINE ITEM 157185

## (undated) DEVICE — SCRUB 10% POVIDONE IODINE 4OZ

## (undated) DEVICE — SEE MEDLINE ITEM 152651

## (undated) DEVICE — WATER STERILE INJ 500ML BAG

## (undated) DEVICE — SHEET DRAPE MEDIUM

## (undated) DEVICE — ELECTRODE RESECTION BUTTON LRG

## (undated) DEVICE — TUBING ARTHRO IRR 4-LEAD

## (undated) DEVICE — SPONGE GAUZE 16PLY 4X4